# Patient Record
(demographics unavailable — no encounter records)

---

## 2017-02-28 NOTE — PDOC
Rapid Medical Evaluation


Time Seen by Provider: 02/28/17 15:29


Medical Evaluation: 


 Allergies











Allergy/AdvReac Type Severity Reaction Status Date / Time


 


Penicillins Allergy Severe Rash Verified 02/28/17 15:32


 


seafood Allergy Severe Rash Uncoded 02/28/17 15:32














02/28/17 15:34


I have performed a brief in-person evaluation of this patient. 


The patient presents with a chief complaint of: 58y/o M cardiac hx, COPD p/w 1 

week progressive cough, SOB. Sent by Dr. Cain to r/o PNA.





Pertinent physical exam findings:


tachy, O2 86-94%


bibasilar coarse breath sounds, slight wheezing


abd soft


tachy





I have ordered the following:


sepsis protocol initiated 2/2 tachy and relative hypoxia, r/o pna


ekg, cxr 





The patient will proceed to the ED for further evaluation.

## 2017-02-28 NOTE — PDOC
History of Present Illness





<Priscila Cordova - Last Filed: 02/28/17 18:23>





- History of Present Illness


Initial Comments: 


02/28/17 17:29


The patient is a 59 year old male with a past medical hx of COPD, asthma, CAD s/

p PCI and stents s/p CABG in 2014, HTN, HLD who presents to the ED complaining 

of cough and shortness of breath for 4 days. He reports his cough is 

productive. The patient reports he saw his PCP on Friday and was given a Zpack 

for his symptoms. The patient notes no relief of his symptoms despite taking 

the Zpack. The patient states he has oxygen at home but is not fully dependent 

on it. He states he used his nebulizer treatment one time today. He reports 

associated chills and diarrhea. 


The patient denies fever, chest pain


The patient denies nausea, vomiting, abdominal pain





PCP: Dr. Whitaker


Cardiologist: Dr. Ulrich


Social: Former smoker


Allergies: Penicillins





<Stormy Loya - Last Filed: 02/28/17 18:42>





- General


Chief Complaint: Shortness of Breath


Stated Complaint: SOB, WHEEZING


Time Seen by Provider: 02/28/17 15:29





Past History





- Past Medical History


Anemia: No


Asthma: No


Cancer: No


Cardiac Disorders: Yes (MI, stent, BYPASS)


CVA: No


COPD: Yes


CHF: Yes


Dementia: No


Diabetes: No


GI Disorders: No


 Disorders: No


HTN: Yes


Hypercholesterolemia: Yes


Liver Disease: No


Psychiatric Problems: Yes (anxiety)


Seizures: No


Thyroid Disease: No





- Surgical History


Abdominal Surgery: No


Appendectomy: No


Cardiac Surgery: Yes (Stent, cardiaccath)


Cholecystectomy: No


Lung Surgery: No


Neurologic Surgery: No


Orthopedic Surgery: Yes (KNEE)





- Immunization History


Immunization Up to Date: Yes





- Psycho/Social/Smoking Cessation Hx


Anxiety: No


Suicidal Ideation: No


Smoking Status: No


Smoking History: Former smoker


Have you smoked in the past 12 months: No


Number of Cigarettes Smoked Daily: 0


If you are a former smoker, when did you quit?: 2 years


Information on smoking cessation initiated: No


Hx Alcohol Use: No


Drug/Substance Use Hx: No


Substance Use Type: None


Hx Substance Use Treatment: No





<Priscila Cordova - Last Filed: 02/28/17 18:23>





<Stormy Loya - Last Filed: 02/28/17 18:42>





- Past Medical History


Allergies/Adverse Reactions: 


 Allergies











Allergy/AdvReac Type Severity Reaction Status Date / Time


 


Penicillins Allergy Severe Rash Verified 02/28/17 15:32


 


seafood Allergy Severe Rash Uncoded 02/28/17 15:32











Home Medications: 


Ambulatory Orders





Acetaminophen [Tylenol .Regular Strength -] 650 mg PO Q6H PRN #0 tablet 05/09/ 16 


Albuterol 2.5/Ipratropium 0.5 [Duoneb -] 1 amp NEB QIDR  amp 11/30/16 


Budesonide/Formeterol Fumarate [SYMBICORT 80/4.5mcg -] 1 puff IH BID  inhaler 11 /30/16 


Clopidogrel Bisulfate [Plavix -] 75 mg PO DAILY  tablet 11/30/16 


Furosemide [Lasix -] 40 mg PO DAILY  tablet 11/30/16 


Metoprolol Tartrate [Lopressor -] 25 mg PO DAILY 02/28/17 


Prednisone [Deltasone -] 10 mg PO DAILY 02/28/17 











Respiratory Specific PMHX





- Complaint Specific PMHX


Angina: No





<Priscila Cordova - Last Filed: 02/28/17 18:23>





**Review of Systems





- Review of Systems


Able to Perform ROS?: Yes


Comments:: 


02/28/17 17:29


CONSTITUTIONAL: 


+Chills Absent: fever, diaphoresis, generalized weakness, malaise, loss of 

appetite


HEENT: 


Absent: rhinorrhea, nasal congestion, throat pain, throat swelling, difficulty 

swallowing, mouth swelling, ear pain, eye pain, visual Changes


CARDIOVASCULAR: 


Absent: chest pain, syncope, palpitations, irregular heart rate, lightheadedness

, peripheral edema


RESPIRATORY: 


+Cough, shortness of breath. Absent: orthopnea, wheezing, stridor, hemoptysis


GASTROINTESTINAL:


+Diarrhea Absent: abdominal pain, abdominal distension, nausea, vomiting, 

constipation, melena, hematochezia


GENITOURINARY: 


Absent: dysuria, frequency, urgency, hesitancy, hematuria, flank pain, genital 

pain


MUSCULOSKELETAL: 


Absent: myalgia, arthralgia, joint swelling


SKIN: 


Absent: rash, itching, pallor


NEUROLOGIC: 


Absent: headache, focal weakness or paresthesias, dizziness, unsteady gait, 

seizure, mental status changes, bladder or bowel incontinence


PSYCHIATRIC: 


Absent: anxiety, depression, suicidal or homicidal ideation, hallucinations.





<Stormy Loya - Last Filed: 02/28/17 18:42>





*Physical Exam





- Vital Signs


 Last Vital Signs











Temp Pulse Resp BP Pulse Ox


 


 98.1 F   116 H  24   130/98   94 L


 


 02/28/17 15:32  02/28/17 15:32  02/28/17 15:32  02/28/17 15:32  02/28/17 15:32














<Priscila Cordova - Last Filed: 02/28/17 18:23>





- Vital Signs


 Last Vital Signs











Temp Pulse Resp BP Pulse Ox


 


 98.1 F   116 H  24   130/98   94 L


 


 02/28/17 15:32  02/28/17 15:32  02/28/17 15:32  02/28/17 15:32  02/28/17 15:32














- Physical Exam


Comments: 


GENERAL:


Well developed, well nourished. Awake and alert. 


HEENT:


Normocephalic, atraumatic. PERRLA, EOMI. No conjunctival pallor. Sclera are non-

icteric. Moist mucous membranes. Oropharynx is clear.


NECK: Supple. Full ROM. No JVD. Carotid pulses 2+ and symmetric, without 

bruits. No thyromegaly. No lymphadenopathy.


CARDIOVASCULAR:


+Tachycardic. Regular rhythm. No murmurs, rubs, or gallops. Distal pulses are 2

+ and symmetric. 


PULMONARY: 


+Hypoxic, dyspneic, scattered rhonchorous breath sounds. No rales.


ABDOMINAL:


Soft. Non-tender. Non-distended. No rebound or guarding. No organomegaly. 

Normoactive bowel sounds. 


MUSCULOSKELETAL 


Normal range of motion at all joints. No bony deformities or tenderness. No CVA 

tenderness.


EXTREMITIES: 


+1+ pitting edema bilateral lower extremities. No cyanosis. No clubbing. No 

calf tenderness.


SKIN: Warm and dry. Normal capillary refill. No rashes. No jaundice. 


NEUROLOGICAL: Alert, awake, appropriate. Cranial nerves 2-12 intact. No 

deficits to light touch and temperature in face, upper extremities and lower 

extremities. Normal speech. 


PSYCHIATRIC: 


Cooperative. Good eye contact. Appropriate mood and affect.








<BoStormy wade - Last Filed: 02/28/17 18:42>





ED Treatment Course





- LABORATORY


CBC & Chemistry Diagram: 


 02/28/17 16:06





 02/28/17 16:06





- ADDITIONAL ORDERS


Additional order review: 


 Laboratory  Results











  02/28/17





  16:06


 


INR  Cancelled


 


PTT (Actin FS)  Cancelled








 











  02/28/17





  16:06


 


RBC  4.66


 


MCV  85.1


 


MCHC  33.3


 


RDW  15.2  D


 


MPV  7.6


 


Neutrophils %  83.7 H


 


Lymphocytes %  10.2  D


 


Monocytes %  5.8


 


Eosinophils %  0.0


 


Basophils %  0.3














- Medications


Given in the ED: 


ED Medications














Discontinued Medications














Generic Name Dose Route Start Last Admin





  Trade Name Freq  PRN Reason Stop Dose Admin


 


Albuterol/Ipratropium  1 amp 02/28/17 16:37 02/28/17 16:44





  Duoneb -  NEB 02/28/17 16:38  1 amp





  ONCE ONE   Administration














<Priscila Cordova - Last Filed: 02/28/17 18:23>





- LABORATORY


CBC & Chemistry Diagram: 


 02/28/17 16:06





 02/28/17 16:06





- ADDITIONAL ORDERS


Additional order review: 


 Laboratory  Results











  02/28/17 02/28/17 02/28/17





  17:15 16:06 16:06


 


INR   


 


PTT (Actin FS)   


 


VBG pH  7.33  


 


POC VBG pCO2  65.7 H* D  


 


POC VBG pO2  24.9 L  


 


Mixed VBG HCO3  34.3 H  


 


Sodium    138


 


Potassium    4.6


 


Chloride    95 L


 


Carbon Dioxide    32


 


Anion Gap    11


 


BUN    12  D


 


Creatinine    0.9


 


Creat Clearance w eGFR    > 60


 


Random Glucose    115 H D


 


Lactic Acid   1.392 


 


Calcium    9.6


 


Total Bilirubin    0.9  D


 


AST    20  D


 


ALT    27  D


 


Alkaline Phosphatase    206 H D


 


Creatine Kinase    80


 


Troponin I    < 0.02  D


 


B-Natriuretic Peptide    46.19


 


Total Protein    8.2  D


 


Albumin    3.8  D


 


Urine Color   


 


Urine Appearance   


 


Urine pH   


 


Ur Specific Gravity   


 


Urine Protein   


 


Urine Glucose (UA)   


 


Urine Ketones   


 


Urine Blood   


 


Urine Nitrite   


 


Urine Bilirubin   


 


Urine Urobilinogen   


 


Ur Leukocyte Esterase   














  02/28/17 02/28/17





  16:06 16:06


 


INR   Cancelled


 


PTT (Actin FS)   Cancelled


 


VBG pH  


 


POC VBG pCO2  


 


POC VBG pO2  


 


Mixed VBG HCO3  


 


Sodium  


 


Potassium  


 


Chloride  


 


Carbon Dioxide  


 


Anion Gap  


 


BUN  


 


Creatinine  


 


Creat Clearance w eGFR  


 


Random Glucose  


 


Lactic Acid  


 


Calcium  


 


Total Bilirubin  


 


AST  


 


ALT  


 


Alkaline Phosphatase  


 


Creatine Kinase  


 


Troponin I  


 


B-Natriuretic Peptide  


 


Total Protein  


 


Albumin  


 


Urine Color  Yellow 


 


Urine Appearance  Clear 


 


Urine pH  5.0 


 


Ur Specific Gravity  1.023 


 


Urine Protein  Negative 


 


Urine Glucose (UA)  Negative 


 


Urine Ketones  Trace H 


 


Urine Blood  Negative 


 


Urine Nitrite  Negative 


 


Urine Bilirubin  Negative 


 


Urine Urobilinogen  Negative 


 


Ur Leukocyte Esterase  Negative 




















 02/28/17 16:06 Influenza Types A,B Antigen (RAI) - Final





 Nasopharyngeal Swab  - Final








 











  02/28/17





  16:06


 


RBC  4.66


 


MCV  85.1


 


MCHC  33.3


 


RDW  15.2  D


 


MPV  7.6


 


Neutrophils %  83.7 H


 


Lymphocytes %  10.2  D


 


Monocytes %  5.8


 


Eosinophils %  0.0


 


Basophils %  0.3














- RADIOLOGY


Radiograph Interpretation: 


02/28/17 17:40


Chest X-Ray


No discrete infiltrate or pleural effusion is noted. 


There has been no definite interval change in comparison to a previous 

radiographic study of 11/26/ 2016. Advanced centrilobular emphysema is noted (as also visualized on a chest 

CT exam of 8/17/2016). 


The heart, alex and mediastinum demonstrate no obvious abnormality. 


Status post median sternotomy. 


Impression: 


No definite interval change is seen as discussed. 


Reported By: Troy Avalos MD 


 02/28/17 1720





- Medications


Given in the ED: 


ED Medications














Discontinued Medications














Generic Name Dose Route Start Last Admin





  Trade Name Freq  PRN Reason Stop Dose Admin


 


Albuterol/Ipratropium  1 amp 02/28/17 16:37 02/28/17 16:44





  Duoneb -  NEB 02/28/17 16:38  1 amp





  ONCE ONE   Administration














<Stormy Loya - Last Filed: 02/28/17 18:42>





Medical Decision Making





- Medical Decision Making


02/28/17 16:57


60 yo male with PMH copd,CAD, s/p cabg  presents for increasing dyspnea and 

cough for past 3-4 adys


-he was referred in by his pulmonololgist Dr Cain


-primary physician Dr Zambrano





Vs  tachycardia 115, hypoxic 89% on room air





-course rhonchi b/l on lung asculatation


-cvs tachy


-scant pitting edema 








differential includes pna,copd exacerbation,chf


-curently receiving resp treatment and placed on 2 L nasal cannula





02/28/17 18:20


negative cardiac enzyme


-bnp is normal


cxr negative 





IMP  copd exavcerba





<Priscila Cordova - Last Filed: 02/28/17 18:23>





- Medical Decision Making





02/28/17 18:42


Paged Dr. Whitaker at 1824, patients case was discussed





<Stormy Loya - Last Filed: 02/28/17 18:42>





*DC/Admit/Observation/Transfer





- Discharge Dispostion


Admit: Yes





<Priscila Cordova - Last Filed: 02/28/17 18:23>





- Attestations


Scribe Attestion: 





02/28/17 17:29





Documentation prepared by Stormy Loya, acting as medical scribe for Priscila Cordova MD/DO.





<Stormy Loya - Last Filed: 02/28/17 18:42>


Diagnosis at time of Disposition: 


 COPD exacerbation, Acute on chronic respiratory failure with hypoxemia, Cough





- Referrals


Referrals: 


Keanu Whitaker MD [Primary Care Provider] -

## 2017-03-01 NOTE — PN
Teaching Attending Note


Name of Resident: Cris Gomez


ATTENDING PHYSICIAN STATEMENT





I saw and evaluated the patient.


I reviewed the resident's note and discussed the case with the resident.


I agree with the resident's findings and plan as documented.





PULMONARY CONSULTATION








IMP ACUTE ON CHRONIC HYPOXEMIC/HYPERCAPNEC RESPIRATORY FAILURE


      COPD EXACERBATION


      ASHD /P CABG,STENTS


      HTN


      HLD





PLAN IV STEROIDS 


        INHALED BRONCHODILATORS


        O2


        BIPAP IF PT DEVELOPES INCREASED RESPIRATORY DISTRESS,HYPERCAPNEA 


        ANTIBIOTICS


        ABG


        DALIRESP





DR OROPEZA








Problem List





- Problems


(1) Acute on chronic respiratory failure with hypoxia and hypercapnia


Code(s): J96.21 - ACUTE AND CHRONIC RESPIRATORY FAILURE WITH HYPOXIA


J96.22 - ACUTE AND CHRONIC RESPIRATORY FAILURE WITH HYPERCAPNIA





(2) COPD exacerbation


Code(s): J44.1 - CHRONIC OBSTRUCTIVE PULMONARY DISEASE W (ACUTE) EXACERBATION





(3) Cough


Code(s): R05 - COUGH





(4) Anxiety


Code(s): F41.9 - ANXIETY DISORDER, UNSPECIFIED





(5) CAD (coronary artery disease)


Code(s): I25.10 - ATHSCL HEART DISEASE OF NATIVE CORONARY ARTERY W/O ANG PCTRS 

  Qualifiers: 


     Coronary Disease-Associated Artery/Lesion type: native artery     Native 

vs. transplanted heart: native heart     Associated angina: without angina     

   Qualified Code(s): I25.10 - Atherosclerotic heart disease of native coronary 

artery without angina pectoris  





(6) COPD (chronic obstructive pulmonary disease)


Code(s): J44.9 - CHRONIC OBSTRUCTIVE PULMONARY DISEASE, UNSPECIFIED   





(7) HTN (hypertension)


Code(s): I10 - ESSENTIAL (PRIMARY) HYPERTENSION   Qualifiers: 


     Hypertension type: essential hypertension        Qualified Code(s): I10 - 

Essential (primary) hypertension  





(8) History of coronary artery bypass graft


Code(s): Z95.1 - PRESENCE OF AORTOCORONARY BYPASS GRAFT

## 2017-03-01 NOTE — EKG
Test Reason : 

Blood Pressure : ***/*** mmHG

Vent. Rate : 110 BPM     Atrial Rate : 110 BPM

   P-R Int : 112 ms          QRS Dur : 130 ms

    QT Int : 352 ms       P-R-T Axes : 079 085 061 degrees

   QTc Int : 476 ms

 

*** POOR DATA QUALITY, INTERPRETATION MAY BE ADVERSELY AFFECTED

SINUS TACHYCARDIA WITH OCCASIONAL PREMATURE VENTRICULAR COMPLEXES

POSSIBLE LEFT ATRIAL ENLARGEMENT

RIGHT BUNDLE BRANCH BLOCK

ABNORMAL ECG

WHEN COMPARED WITH ECG OF 24-NOV-2016 16:33,

PREMATURE VENTRICULAR COMPLEXES ARE NOW PRESENT

Confirmed by GERSON TIERNEY, TATA ([id number]) on 3/1/2017 2:13:27 PM

 

Referred By:             Confirmed By:TATA NIETO MD

## 2017-03-01 NOTE — HP
DATE OF ADMISSION:

 

HISTORY:  This is a 59-year-old male known to have COPD, ASHD status post coronary

artery bypass graft surgery who was sent by Dr. Cain to the emergency room because

of increasing shortness of breath.  In the ER, chest x-ray was done, which was no

infiltrate.  Got admitted with the diagnosis of acute exacerbation of COPD.  This

morning he is feeling a little better.

 

PHYSICAL EXAMINATION: 

General:  Awake, alert, and oriented not in distress.

Vital Signs:  /80, pulse 72, respirations 20, temperature 97.

HEENT:  Unremarkable.

NECK:  Supple.  No JVD.

LUNGS:  Air entry poor both sides.

HEART:  S1, S2 normal.  No S3, S4.

ABDOMEN:  Soft, nontender.

EXTREMITIES:  Legs, no edema.

NEUROLOGIC:  Grossly normal.

 

LABORATORY:  At the time of admission, WBC 11.2 and this morning 8.4.  Hemoglobin

12.3.  Chemistry:  Electrolytes are normal.  Albumin 3.2.  Chest x-ray:  Chronic

changes, no acute infiltrate.

 

IMPRESSION:  Exacerbation of chronic obstructive pulmonary disease, arteriosclerotic

heart disease.

 

PLAN:  IV steroids.  IV antibiotics.  Pulmonary consult, Dr. Cain.  Will follow.

 

 

 

ARNOL SAPP M.D.

 

[id number]

DD: 03/01/2017 09:43

DT: 03/01/2017 18:30

Job #:  [id number]

## 2017-03-01 NOTE — HOSP
Subjective





- Review of Symptoms


Events since last encounter: 


RN call to inform that patient was getting IV azithromycin and it infiltrated 

in surrounding tissue 





Patient seen and examined. 


Patient complains of pain in site of infiltration, denies and numbness and 

weakness. Denies rash, 


Moving all fingers of left had.  





on exam 


/78


hr 119


SPO2 95% ON 2 L





chest ; scattered rhonci breath sounds. No rales.


CVS s1s2 normal


local exam: Left upper limb  moving all fingers, sensation to touch intact, 

power 5/5, peripheral pulses palpable. mild swelling preset in site of 

infiltration, tender    to touch, no erythema. 








Adv ; 





Remove IV line from site of infiltration.


limb elevation 


cold compression   











 





Physical Examination


Vital Signs: 


 Vital Signs











Temperature  98.1 F   02/28/17 15:32


 


Pulse Rate  118 H  02/28/17 19:02


 


Respiratory Rate  20   02/28/17 19:02


 


Blood Pressure  139/108   02/28/17 19:02


 


O2 Sat by Pulse Oximetry (%)  100   02/28/17 19:02














Visit type





- Emergency Visit


Emergency Visit: Yes


ED Registration Date: 02/28/17


Care time: The patient presented to the Emergency Department on the above date 

and was hospitalized for further evaluation of their emergent condition.





- New Patient


This patient is new to me today: Yes


Date on this admission: 03/01/17





- Critical Care


Critical Care patient: No

## 2017-03-01 NOTE — CONSULT
Consultation: 


REQUESTING PROVIDER:





CONSULT REQUEST: We have been asked to medically evaluate this patient for sob





HISTORY OF PRESENT ILLNESS:


This is a 60 yo m with PMH of COPD on 2L at home (noncompliant), past heavy 

smoker, asthma, CAD s/p PCI, CABG 2014, HTN, HLD, who presents due to SOB that 

started 4 days ago and has gotten worse. He reports increased cough productive 

of yellow flem, chills and diarrhea. His PCP prescribed him a Z Pack past fri, 

which helped loosen his respiratory secretions but didnt improve his sob. He 

currently feels unimproved from presentation. He is able to ambulate to 

bathroom on 2 L O2. He denies f/c, n/v, chest pain, abd pain, h/a, rhinorrhea 

or throat pain. Denies sick contacts. He smoked 1.5 packs/day (quit) and worked 

as a . 








REVIEW OF SYSTEMS:


CONSTITUTIONAL: 


Absent:  diaphoresis, malaise, loss of appetite, weight change


HEENT: 


Absent:  rhinorrhea, nasal congestion, throat pain, throat swelling


CARDIOVASCULAR: 


Absent: chest pain, syncope, palpitations, peripheral edema


RESPIRATORY: 


Absent: orthopnea, stridor, hemoptysis


GASTROINTESTINAL:


Absent: abdominal pain, abdominal distension, nausea, vomiting


GENITOURINARY: 


Absent: dysuria


MUSCULOSKELETAL: 


Absent: myalgia, arthralgia


SKIN: 


Absent: rash, itching, pallor


HEMATOLOGIC/IMMUNOLOGIC: 


Absent: frequent infections


ENDOCRINE:


Absent: heat intolerance, cold intolerance


NEUROLOGIC: 


Absent: headache, focal weakness or paresthesias, dizziness


PSYCHIATRIC: 


Absent: anxiety, depression





PHYSICAL EXAMINATION





 Vital Signs - 24 hr











  02/28/17 02/28/17 03/01/17





  19:02 21:00 01:48


 


Temperature  98.9 F 


 


Pulse Rate 118 H 119 H 89


 


Respiratory 20 24 





Rate   


 


Blood Pressure 139/108 151/78 


 


O2 Sat by Pulse 100 95 





Oximetry (%)   














  03/01/17 03/01/17 03/01/17





  02:00 06:00 10:00


 


Temperature  97.8 F 97.8 F


 


Pulse Rate  92 H 108 H


 


Respiratory  20 24





Rate   


 


Blood Pressure  127/72 127/78


 


O2 Sat by Pulse 100  





Oximetry (%)   














GENERAL: Awake, alert, and fully oriented, in no acute distress.


HEAD: Normal with no signs of trauma.


EYES: Pupils equal, round and reactive to light, extraocular movements intact, 

sclera anicteric, conjunctiva clear. 


EARS, NOSE, THROAT: Moist mucous membranes.


NECK: supple without JVD


LUNGS: diffusely restricted air movement, no wheezes 


HEART: Regular rate and rhythm, normal S1 and S2 


ABDOMEN: Soft, nontender, not distended, normoactive bowel sounds


MUSCULOSKELETAL: No CVA tenderness.


UPPER EXTREMITIES: No peripheral edema.


LOWER EXTREMITIES: No peripheral edema. 


NEUROLOGICAL:  Cranial nerves II-XII grossly intact. Normal speech. 


PSYCHIATRIC: Cooperative. Good eye contact. Appropriate mood and affect.


SKIN: Warm, dry











 Laboratory Results - last 24 hr











  03/01/17 03/01/17





  07:15 07:15


 


WBC  8.4 


 


RBC  4.35 


 


Hgb  12.3 


 


Hct  37.2 


 


MCV  85.5 


 


MCHC  33.1 


 


RDW  14.7 


 


Plt Count  347 


 


MPV  7.2 L 


 


Sodium   137


 


Potassium   4.6


 


Chloride   99


 


Carbon Dioxide   31


 


Anion Gap   7 L


 


BUN   15  D


 


Creatinine   0.8


 


Creat Clearance w eGFR   > 60


 


Random Glucose   124 H


 


Calcium   9.3


 


Total Bilirubin   0.6  D


 


AST   15  D


 


ALT   20  D


 


Alkaline Phosphatase   175 H


 


Total Protein   7.1


 


Albumin   3.2 L








Active Medications











Generic Name Dose Route Start Last Admin





  Trade Name Freq  PRN Reason Stop Dose Admin


 


Acetaminophen  650 mg 02/28/17 21:11 03/01/17 10:40





  Tylenol -  PO   650 mg





  Q6H PRN   Administration





  FEVER OR PAIN   


 


Albuterol/Ipratropium  1 amp 03/01/17 00:00 03/01/17 06:11





  Duoneb -  NEB   1 amp





  QIDR ISAIAH   Administration


 


Alprazolam  0.5 mg 02/28/17 21:13 02/28/17 22:40





  Xanax -  PO   0.5 mg





  HS PRN   Administration


 


Budesonide/Formoterol Fumarate  1 puff 02/28/17 22:00 03/01/17 10:42





  Symbicort 80/4.5mcg -  IH   1 inh





  BID ISAIAH   Administration


 


Ceftriaxone Sodium  1 gm 02/28/17 22:00 03/01/17 11:37





  Rocephin 1gm Ivpb (Pre-Docked)  IVPB   1 gm





  BID ISAIAH   Administration


 


Clopidogrel Bisulfate  75 mg 03/01/17 10:00 03/01/17 10:40





  Plavix -  PO   75 mg





  DAILY ISAIAH   Administration


 


Furosemide  40 mg 03/01/17 10:00 03/01/17 10:42





  Lasix -  PO   40 mg





  DAILY ISAIAH   Administration


 


Azithromycin 250 mg/ Dextrose  250 mls @ 250 mls/hr 02/28/17 21:15 03/01/17 12:

17





  IVPB 03/02/17 10:59  250 mls/hr





  DAILY ISAIAH   Administration


 


Methylprednisolone Sodium Succinate  40 mg 03/01/17 02:00 03/01/17 10:40





  Solu-Medrol -  IVPB   40 mg





  Q8H-IV ISAIAH   Administration











ASSESSMENT/PLAN:








Acute on chonic COPD exacerbation with hypoxemic, hypercarbic respiratory 

failure 


-possibly superimposed PNA 


-CXR unremarkable


-noncompliant with home O2 (supposed to wear continuously) 


-clinically unimproved at this time


-Medrol 40 Q8


-azithromycin, rocephin day 2


-daliresp, spiriva


-symbicort, dioneb


-supplemental O2 NC, possible BIPAP if desats 


-will require pulmonary rehab     





CAD


-s/p PCI, CABG


-No apparent acute events/exacerbation 


-regular outpatient cardiac f/u     





HTN


-lasix       





HLD


-continue home meds 





Dispo: We will continue to follow the patient. Thank you for this consultative 

opportunity.











Problem List





- Problems


(1) Acute on chronic respiratory failure with hypoxemia


Code(s): J96.21 - ACUTE AND CHRONIC RESPIRATORY FAILURE WITH HYPOXIA





(2) Acute on chronic respiratory failure with hypoxia and hypercapnia


Code(s): J96.21 - ACUTE AND CHRONIC RESPIRATORY FAILURE WITH HYPOXIA


J96.22 - ACUTE AND CHRONIC RESPIRATORY FAILURE WITH HYPERCAPNIA





(3) COPD exacerbation


Code(s): J44.1 - CHRONIC OBSTRUCTIVE PULMONARY DISEASE W (ACUTE) EXACERBATION





(4) Cough


Code(s): R05 - COUGH





(5) Abnormal LFTs


Code(s): R79.89 - OTHER SPECIFIED ABNORMAL FINDINGS OF BLOOD CHEMISTRY





(6) Anxiety


Code(s): F41.9 - ANXIETY DISORDER, UNSPECIFIED





(7) CAD (coronary artery disease)


Code(s): I25.10 - ATHSCL HEART DISEASE OF NATIVE CORONARY ARTERY W/O ANG PCTRS 

  Qualifiers: 


     Coronary Disease-Associated Artery/Lesion type: native artery     Native 

vs. transplanted heart: native heart     Associated angina: without angina     

   Qualified Code(s): I25.10 - Atherosclerotic heart disease of native coronary 

artery without angina pectoris  





(8) COPD (chronic obstructive pulmonary disease)


Code(s): J44.9 - CHRONIC OBSTRUCTIVE PULMONARY DISEASE, UNSPECIFIED   





(9) HTN (hypertension)


Code(s): I10 - ESSENTIAL (PRIMARY) HYPERTENSION   Qualifiers: 


     Hypertension type: essential hypertension        Qualified Code(s): I10 - 

Essential (primary) hypertension  





(10) History of coronary artery bypass graft


Code(s): Z95.1 - PRESENCE OF AORTOCORONARY BYPASS GRAFT





(11) History of percutaneous coronary intervention


Code(s): Z98.89 - OTHER SPECIFIED POSTPROCEDURAL STATES * DO NOT USE *





(12) Hypercholesterolemia


Code(s): E78.0 - PURE HYPERCHOLESTEROLEMIA * DO NOT USE *





(13) Pneumonia


Code(s): J18.9 - PNEUMONIA, UNSPECIFIED ORGANISM   








Visit type





- Emergency Visit


Emergency Visit: Yes


ED Registration Date: 02/28/17


Care time: The patient presented to the Emergency Department on the above date 

and was hospitalized for further evaluation of their emergent condition.





- New Patient


This patient is new to me today: Yes


Date on this admission: 03/01/17





- Critical Care


Critical Care patient: No

## 2017-03-02 NOTE — PN
Teaching Attending Note


Name of Resident: Cris Gomez


ATTENDING PHYSICIAN STATEMENT








I saw and evaluated the patient.


I reviewed the resident's note and discussed the case with the resident.


I agree with the resident's findings and plan as documented.














IMP ACUTE ON CHRONIC HYPOXEMIC/HYPERCAPNEC RESPIRATORY FAILURE


      COPD EXACERBATION


      ASHD /P CABG,STENTS


      HTN


      HLD





PLAN IV STEROIDS 


        INHALED BRONCHODILATORS


        O2


        BIPAP IF PT DEVELOPES INCREASED RESPIRATORY DISTRESS,HYPERCAPNEA 


        ANTIBIOTICS


        ABG


        ELLA SOLIZ MD

## 2017-03-02 NOTE — PN
Physical Exam: 


SUBJECTIVE: Patient seen and examined





Patient resting in bed comfortably NAD. No acute events. afebrile and 

hemodynamically stable. 98% on 2 L. states he vomited at night duwe to severe 

cough, which has now improved. States he feels a little less sob as well. 

yellow sputum. walked to  w/o O2 and mild lightneadedness. Denies f/c

, chest pain, abd pain, n/v, h/a, diarrhea, dysuria. 





OBJECTIVE:





 Vital Signs











 Period  Temp  Pulse  Resp  BP Sys/Baer  Pulse Ox


 


 Last 24 Hr  97.2 F-98.5 F  109-112  16-24  116-142/68-79  98











GENERAL: Awake, alert, and fully oriented, in no acute distress.


HEAD: Normal with no signs of trauma.


EYES: Pupils equal, round and reactive to light, extraocular movements intact, 

sclera anicteric, conjunctiva clear. 


EARS, NOSE, THROAT: Moist mucous membranes.


NECK: supple without JVD


LUNGS: slightly improved air movement


HEART: Regular rate and rhythm, normal S1 and S2 


ABDOMEN: Soft, nontender, not distended, normoactive bowel sounds


MUSCULOSKELETAL: No CVA tenderness.


UPPER EXTREMITIES: No peripheral edema.


LOWER EXTREMITIES: No peripheral edema. 


NEUROLOGICAL:  Cranial nerves II-XII grossly intact. Normal speech. 


PSYCHIATRIC: Cooperative. Good eye contact. Appropriate mood and affect.


SKIN: Warm, dry





Active Medications











Generic Name Dose Route Start Last Admin





  Trade Name Freq  PRN Reason Stop Dose Admin


 


Acetaminophen  650 mg 02/28/17 21:11 03/01/17 10:40





  Tylenol -  PO   650 mg





  Q6H PRN   Administration





  FEVER OR PAIN   


 


Albuterol Sulfate  1 amp 03/01/17 14:03 03/02/17 03:38





  Ventolin 0.083% Nebulizer Soln -  NEB   1 amp





  Q4H PRN   Administration





  SHORT OF BREATH/WHEEZING   


 


Alprazolam  0.5 mg 02/28/17 21:13 03/01/17 21:25





  Xanax -  PO   0.5 mg





  HS PRN   Administration


 


Budesonide/Formoterol Fumarate  1 puff 02/28/17 22:00 03/02/17 10:23





  Symbicort 80/4.5mcg -  IH   1 inh





  BID ISAIAH   Administration


 


Ceftriaxone Sodium  1 gm 02/28/17 22:00 03/02/17 10:19





  Rocephin 1gm Ivpb (Pre-Docked)  IVPB   1 gm





  BID ISAIAH   Administration


 


Clopidogrel Bisulfate  75 mg 03/01/17 10:00 03/02/17 10:19





  Plavix -  PO   75 mg





  DAILY ISAIAH   Administration


 


Furosemide  40 mg 03/01/17 10:00 03/02/17 10:18





  Lasix -  PO   40 mg





  DAILY ISAIAH   Administration


 


Methylprednisolone Sodium Succinate  60 mg 03/01/17 15:00 03/02/17 10:18





  Solu-Medrol -  IVPB   60 mg





  Q6H-IV ISAIAH   Administration


 


Roflumilast  500 mcg 03/01/17 14:15 03/02/17 10:18





  Daliresp -  PO   500 mcg





  DAILY ISAIAH   Administration


 


Tiotropium Bromide  1 puff 03/01/17 14:15 03/02/17 10:19





  Spiriva -  IH   1 pfu





  DAILY ISAIAH   Administration











ASSESSMENT/PLAN:


Acute on chonic COPD exacerbation with hypoxemic, hypercarbic respiratory 

failure 


-possibly superimposed PNA 


-CXR unremarkable


-noncompliant with home O2 (supposed to wear continuously) 


-slight clinical improvement 


-Medrol 40 Q8 d 2 


-azithromycin, rocephin day 3


-daliresp, spiriva


-symbicort, dioneb


-supplemental O2 NC, possible BIPAP if desats 


-will require pulmonary rehab     





CAD


-s/p PCI, CABG


-No apparent acute events/exacerbation 


-regular outpatient cardiac f/u     





HTN


-lasix       





HLD


-continue home meds 





Dispo: We will continue to follow the patient. Thank you for this consultative 

opportunity.





Problem List





- Problems


(1) Acute on chronic respiratory failure with hypoxemia


Code(s): J96.21 - ACUTE AND CHRONIC RESPIRATORY FAILURE WITH HYPOXIA





(2) Acute on chronic respiratory failure with hypoxia and hypercapnia


Code(s): J96.21 - ACUTE AND CHRONIC RESPIRATORY FAILURE WITH HYPOXIA


J96.22 - ACUTE AND CHRONIC RESPIRATORY FAILURE WITH HYPERCAPNIA





(3) COPD exacerbation


Code(s): J44.1 - CHRONIC OBSTRUCTIVE PULMONARY DISEASE W (ACUTE) EXACERBATION





(4) Cough


Code(s): R05 - COUGH





(5) Abnormal LFTs


Code(s): R79.89 - OTHER SPECIFIED ABNORMAL FINDINGS OF BLOOD CHEMISTRY





(6) Anxiety


Code(s): F41.9 - ANXIETY DISORDER, UNSPECIFIED





(7) CAD (coronary artery disease)


Code(s): I25.10 - ATHSCL HEART DISEASE OF NATIVE CORONARY ARTERY W/O ANG PCTRS 

  Qualifiers: 


     Coronary Disease-Associated Artery/Lesion type: native artery     Native 

vs. transplanted heart: native heart     Associated angina: without angina     

   Qualified Code(s): I25.10 - Atherosclerotic heart disease of native coronary 

artery without angina pectoris  





(8) COPD (chronic obstructive pulmonary disease)


Code(s): J44.9 - CHRONIC OBSTRUCTIVE PULMONARY DISEASE, UNSPECIFIED   





(9) HTN (hypertension)


Code(s): I10 - ESSENTIAL (PRIMARY) HYPERTENSION   Qualifiers: 


     Hypertension type: essential hypertension        Qualified Code(s): I10 - 

Essential (primary) hypertension  





(10) History of coronary artery bypass graft


Code(s): Z95.1 - PRESENCE OF AORTOCORONARY BYPASS GRAFT





(11) History of percutaneous coronary intervention


Code(s): Z98.89 - OTHER SPECIFIED POSTPROCEDURAL STATES * DO NOT USE *





(12) Hypercholesterolemia


Code(s): E78.0 - PURE HYPERCHOLESTEROLEMIA * DO NOT USE *





(13) Pneumonia


Code(s): J18.9 - PNEUMONIA, UNSPECIFIED ORGANISM   








Visit type





- Emergency Visit


Emergency Visit: Yes


ED Registration Date: 02/28/17


Care time: The patient presented to the Emergency Department on the above date 

and was hospitalized for further evaluation of their emergent condition.





- New Patient


This patient is new to me today: No





- Critical Care


Critical Care patient: No





- Discharge Referral


Referred to [facility] No

## 2017-03-02 NOTE — PN
Progress Note, Physician


Chief Complaint: 


SOB persists


History of Present Illness: 


Dr Meng pulmonary consult appreciated





- Current Medication List


Current Medications: 


Active Medications





Acetaminophen (Tylenol -)  650 mg PO Q6H PRN


   PRN Reason: FEVER OR PAIN


   Last Admin: 03/01/17 10:40 Dose:  650 mg


Albuterol Sulfate (Ventolin 0.083% Nebulizer Soln -)  1 amp NEB Q4H PRN


   PRN Reason: SHORT OF BREATH/WHEEZING


   Last Admin: 03/02/17 03:38 Dose:  1 amp


Alprazolam (Xanax -)  0.5 mg PO HS PRN


   Last Admin: 03/01/17 21:25 Dose:  0.5 mg


Budesonide/Formoterol Fumarate (Symbicort 80/4.5mcg -)  1 puff IH BID ISAIAH


   Last Admin: 03/01/17 21:25 Dose:  1 inh


Ceftriaxone Sodium (Rocephin 1gm Ivpb (Pre-Docked))  1 gm IVPB BID ISAIAH


   Last Admin: 03/01/17 21:25 Dose:  1 gm


Clopidogrel Bisulfate (Plavix -)  75 mg PO DAILY [facility]


   Last Admin: 03/01/17 10:40 Dose:  75 mg


Furosemide (Lasix -)  40 mg PO DAILY [facility]


   Last Admin: 03/01/17 10:42 Dose:  40 mg


Azithromycin 250 mg/ Dextrose  250 mls @ 250 mls/hr IVPB DAILY [facility]


   Stop: 03/02/17 10:59


   Last Admin: 03/01/17 12:17 Dose:  250 mls/hr


Methylprednisolone Sodium Succinate (Solu-Medrol -)  60 mg IVPB Q6H-IV ISAIAH


   Last Admin: 03/02/17 03:25 Dose:  60 mg


Roflumilast (Daliresp -)  500 mcg PO DAILY ISAIAH


   Last Admin: 03/01/17 16:50 Dose:  500 mcg


Tiotropium Bromide (Spiriva -)  1 puff IH DAILY [facility]


   Last Admin: 03/01/17 16:50 Dose:  1 pfu











- Objective


Vital Signs: 


 Vital Signs











Temperature  97.9 F   03/02/17 06:00


 


Pulse Rate  109 H  03/02/17 06:00


 


Respiratory Rate  18   03/02/17 06:00


 


Blood Pressure  116/70   03/02/17 06:00


 


O2 Sat by Pulse Oximetry (%)  98   03/01/17 21:00











Constitutional: Yes: Mild Distress


Eyes: Yes: WNL


HENT: Yes: WNL


Neck: Yes: WNL


Cardiovascular: Yes: WNL


Respiratory: Yes: On Nasal O2, SOB


Gastrointestinal: Yes: WNL


...Rectal Exam: Yes: Deferred


Genitourinary: Yes: WNL


Breast(s): Yes: WNL


Musculoskeletal: Yes: WNL


Neurological: Yes: Alert


Labs: 


 CBC, BMP





 03/01/17 07:15 





 03/01/17 07:15 





 INR, PTT











INR  1.06  (0.82-1.09)   02/28/17  16:06    














Assessment/Plan


Continue same trt

## 2017-03-03 NOTE — CON.CARD
Consult


Consult Specialty:: Cardiology 


Reason for Consultation:: SOB





- History of Present Illness


History of Present Illness: 


This is a 58 yo m with PMH of COPD on 2L at home (noncompliant), past heavy 

smoker, asthma, CAD s/p PCI, CABG 2014, HTN, HLD, who presents due to SOB that 

started 4 days ago and has gotten worse. He reports increased cough productive 

of yellow flem, chills and diarrhea. His PCP prescribed him a Z Pack past fri, 

which helped loosen his respiratory secretions but didnt improve his sob. He 

currently feels unimproved from presentation. He is able to ambulate to 

bathroom on 2 L O2. He denies f/c, n/v, chest pain, abd pain, h/a, rhinorrhea 

or throat pain. Denies sick contacts. He smoked 1.5 packs/day (quit) and worked 

as a . 








   CABG SVG to RCA   Dec. 2014   Dr. Duarte      


 


Medical History      Reviewed


Condition   Date   Treating Physician   Comments


Abnormal stress test showing inferior wall ischemia   October 13, 2014   [facility]      


CAD- Stent o RCA   2008   [facility]      


COPD            


 oRCA prior stent site, o/w nonobstructive ds   Oct. 2014   Dr. France 

[facility]      


Rheumatoid Arthritis            


unsucesfull  attempt   Dec. 3 2014   [facility]      


 





- History Source


History Provided By: Patient, Medical Record





- Past Medical History


Cardio/Vascular: Yes: CAD, HTN, Hyperlipdemia


Pulmonary: Yes: COPD





- Past Surgical History


Past Surgical History: Yes: CABG, Stent





- Alcohol/Substance Use


Hx Alcohol Use: No





- Smoking History


Smoking history: Former smoker


Have you smoked in the past 12 months: No


Aproximately how many cigarettes per day: 0


If you are a former smoker, when did you quit?: 2 years





Home Medications





- Allergies


Allergies/Adverse Reactions: 


 Allergies











Allergy/AdvReac Type Severity Reaction Status Date / Time


 


Penicillins Allergy Severe Rash Verified 02/28/17 15:32


 


seafood Allergy Severe Rash Uncoded 02/28/17 15:32














- Home Medications


Home Medications: 


Ambulatory Orders





Acetaminophen [Tylenol .Regular Strength -] 650 mg PO Q6H PRN #0 tablet 05/09/ 16 


Albuterol 2.5/Ipratropium 0.5 [Duoneb -] 1 amp NEB QIDR  amp 11/30/16 


Budesonide/Formeterol Fumarate [SYMBICORT 80/4.5mcg -] 1 puff IH BID  inhaler 11 /30/16 


Clopidogrel Bisulfate [Plavix -] 75 mg PO DAILY  tablet 11/30/16 


Furosemide [Lasix -] 40 mg PO DAILY  tablet 11/30/16 


Metoprolol Tartrate [Lopressor -] 25 mg PO DAILY 02/28/17 


Prednisone [Deltasone -] 10 mg PO DAILY 02/28/17 











Family Disease History





- Family Disease History


Family Disease History: Other: Brother (Lupus)





Review of Systems





- Review of Systems


Constitutional: reports: No Symptoms


Eyes: reports: No Symptoms


HENT: reports: No Symptoms


Neck: reports: No Symptoms


Cardiovascular: reports: No Symptoms


Respiratory: reports: SOB


Gastrointestinal: reports: No Symptoms


Genitourinary: reports: No Symptoms


Breasts: reports: No Symptoms Reported


Musculoskeletal: reports: No Symptoms


Integumentary: reports: No Symptoms


Neurological: reports: No Symptoms


Endocrine: reports: No Symptoms


Hematology/Lymphatic: reports: No Symptoms


Psychiatric: reports: No Symptoms


Vital Signs: 


 Vital Signs











Temperature  97.9 F   03/03/17 14:57


 


Pulse Rate  121 H  03/03/17 14:57


 


Respiratory Rate  22   03/03/17 14:57


 


Blood Pressure  140/74   03/03/17 14:57


 


O2 Sat by Pulse Oximetry (%)  93 L  03/03/17 10:05











Constitutional: Yes: Well Nourished, No Distress, Calm


Eyes: Yes: WNL, Conjunctiva Clear, EOM Intact


HENT: Yes: WNL, Atraumatic, Normocephalic


Neck: Yes: WNL, Supple, Trachea Midline


Respiratory: Yes: Diminished, SOB, Wheezes


Gastrointestinal: Yes: WNL, Normal Bowel Sounds


Renal/: Yes: WNL


Cardiovascular: Yes: WNL, Regular Rate and Rhythm


Musculoskeletal: Yes: WNL


Extremities: Yes: WNL


Integumentary: Yes: WNL


Neurological: Yes: WNL, Alert, Oriented


...Motor Strength: WNL


Psychiatric: Yes: WNL, Alert, Oriented





- Other Data


Labs, Other Data: 


 CBC, BMP





 03/01/17 07:15 





 03/01/17 07:15 





 INR, PTT











INR  1.06  (0.82-1.09)   02/28/17  16:06    








 Laboratory Tests











  02/28/17 02/28/17 02/28/17





  16:06 16:06 16:06


 


WBC  11.2 H D  


 


RBC  4.66  


 


Hgb  13.2  D  


 


Hct  39.6  


 


MCV  85.1  


 


MCHC  33.3  


 


RDW  15.2  D  


 


Plt Count  385  


 


MPV  7.6  


 


Neutrophils %  83.7 H  


 


Lymphocytes %  10.2  D  


 


Monocytes %  5.8  


 


Eosinophils %  0.0  


 


Basophils %  0.3  


 


INR   1.06 


 


PTT (Actin FS)   32.0 


 


VBG pH   


 


POC VBG pCO2   


 


POC VBG pO2   


 


Mixed VBG HCO3   


 


Sodium   


 


Potassium   


 


Chloride   


 


Carbon Dioxide   


 


Anion Gap   


 


BUN   


 


Creatinine   


 


Creat Clearance w eGFR   


 


Random Glucose   


 


Lactic Acid   


 


Calcium   


 


Total Bilirubin   


 


AST   


 


ALT   


 


Alkaline Phosphatase   


 


Creatine Kinase   


 


Troponin I   


 


B-Natriuretic Peptide   


 


Total Protein   


 


Albumin   


 


Urine Color    Yellow


 


Urine Appearance    Clear


 


Urine pH    5.0


 


Ur Specific Gravity    1.023


 


Urine Protein    Negative


 


Urine Glucose (UA)    Negative


 


Urine Ketones    Trace H


 


Urine Blood    Negative


 


Urine Nitrite    Negative


 


Urine Bilirubin    Negative


 


Urine Urobilinogen    Negative


 


Ur Leukocyte Esterase    Negative














  02/28/17 02/28/17 02/28/17





  16:06 16:06 17:08


 


WBC   


 


RBC   


 


Hgb   


 


Hct   


 


MCV   


 


MCHC   


 


RDW   


 


Plt Count   


 


MPV   


 


Neutrophils %   


 


Lymphocytes %   


 


Monocytes %   


 


Eosinophils %   


 


Basophils %   


 


INR   


 


PTT (Actin FS)   


 


VBG pH   


 


POC VBG pCO2   


 


POC VBG pO2   


 


Mixed VBG HCO3   


 


Sodium  138  


 


Potassium  4.6  


 


Chloride  95 L  


 


Carbon Dioxide  32  


 


Anion Gap  11  


 


BUN  12  D  


 


Creatinine  0.9  


 


Creat Clearance w eGFR  > 60  


 


Random Glucose  115 H D  


 


Lactic Acid   1.392  1.541


 


Calcium  9.6  


 


Total Bilirubin  0.9  D  


 


AST  20  D  


 


ALT  27  D  


 


Alkaline Phosphatase  206 H D  


 


Creatine Kinase  80  


 


Troponin I  < 0.02  D  


 


B-Natriuretic Peptide  46.19  


 


Total Protein  8.2  D  


 


Albumin  3.8  D  


 


Urine Color   


 


Urine Appearance   


 


Urine pH   


 


Ur Specific Gravity   


 


Urine Protein   


 


Urine Glucose (UA)   


 


Urine Ketones   


 


Urine Blood   


 


Urine Nitrite   


 


Urine Bilirubin   


 


Urine Urobilinogen   


 


Ur Leukocyte Esterase   














  02/28/17 03/01/17 03/01/17





  17:15 07:15 07:15


 


WBC   8.4 


 


RBC   4.35 


 


Hgb   12.3 


 


Hct   37.2 


 


MCV   85.5 


 


MCHC   33.1 


 


RDW   14.7 


 


Plt Count   347 


 


MPV   7.2 L 


 


Neutrophils %   


 


Lymphocytes %   


 


Monocytes %   


 


Eosinophils %   


 


Basophils %   


 


INR   


 


PTT (Actin FS)   


 


VBG pH  7.33  


 


POC VBG pCO2  65.7 H* D  


 


POC VBG pO2  24.9 L  


 


Mixed VBG HCO3  34.3 H  


 


Sodium    137


 


Potassium    4.6


 


Chloride    99


 


Carbon Dioxide    31


 


Anion Gap    7 L


 


BUN    15  D


 


Creatinine    0.8


 


Creat Clearance w eGFR    > 60


 


Random Glucose    124 H


 


Lactic Acid   


 


Calcium    9.3


 


Total Bilirubin    0.6  D


 


AST    15  D


 


ALT    20  D


 


Alkaline Phosphatase    175 H


 


Creatine Kinase   


 


Troponin I   


 


B-Natriuretic Peptide   


 


Total Protein    7.1


 


Albumin    3.2 L


 


Urine Color   


 


Urine Appearance   


 


Urine pH   


 


Ur Specific Gravity   


 


Urine Protein   


 


Urine Glucose (UA)   


 


Urine Ketones   


 


Urine Blood   


 


Urine Nitrite   


 


Urine Bilirubin   


 


Urine Urobilinogen   


 


Ur Leukocyte Esterase   














Imaging





- Results


Chest X-ray: Image Reviewed (emphysema s/p cabg)


EKG: Image Reviewed (sr rep abn)





Assessment/Plan


ashd s/p pci


s/p cabg


copd


chf


hld





Plan


cont present rx


check bnp


cont lasix


cont plavix

## 2017-03-03 NOTE — PN
Teaching Attending Note


Name of Resident: Cris Gomez


ATTENDING PHYSICIAN STATEMENT





I saw and evaluated the patient.


I reviewed the resident's note and discussed the case with the resident.


I agree with the resident's findings and plan as documented.








PULMONARY 








alert,less dyspneic,+cough








IMP ACUTE ON CHRONIC HYPOXEMIC/HYPERCAPNEC RESPIRATORY FAILURE


      COPD EXACERBATION


      ASHD /P CABG,STENTS


      HTN


      HLD





PLAN CONTINUE IV STEROIDS SAME DOSE


        INHALED BRONCHODILATORS


        O2


        BIPAP IF PT DEVELOPES INCREASED RESPIRATORY DISTRESS,HYPERCAPNEA 


        ANTIBIOTICS


        DALIRESP





DR OROPEZA








 Problem List 





- Problems


(1) Acute on chronic respiratory failure with hypoxia and hypercapnia


Code(s): J96.21 - ACUTE AND CHRONIC RESPIRATORY FAILURE WITH HYPOXIA


J96.22 - ACUTE AND CHRONIC RESPIRATORY FAILURE WITH HYPERCAPNIA





(2) COPD exacerbation


Code(s): J44.1 - CHRONIC OBSTRUCTIVE PULMONARY DISEASE W (ACUTE) EXACERBATION





(3) Cough


Code(s): R05 - COUGH





(4) Anxiety


Code(s): F41.9 - ANXIETY DISORDER, UNSPECIFIED





(5) CAD (coronary artery disease)


Code(s): I25.10 - ATHSCL HEART DISEASE OF NATIVE CORONARY ARTERY W/O ANG PCTRS 

  Qualifiers: 


     Coronary Disease-Associated Artery/Lesion type: native artery     Native 

vs. transplanted heart: native heart     Associated angina: without angina     

   Qualified Code(s): I25.10 - Atherosclerotic heart disease of native coronary 

artery without angina pectoris  





(6) COPD (chronic obstructive pulmonary disease)


Code(s): J44.9 - CHRONIC OBSTRUCTIVE PULMONARY DISEASE, UNSPECIFIED   





(7) HTN (hypertension)


Code(s): I10 - ESSENTIAL (PRIMARY) HYPERTENSION   Qualifiers: 


     Hypertension type: essential hypertension        Qualified Code(s): I10 - 

Essential (primary) hypertension  





(8) History of coronary artery bypass graft


Code(s): Z95.1 - PRESENCE OF AORTOCORONARY BYPASS GRAFT














Problem List





- Problems


(1) Acute on chronic respiratory failure with hypoxia and hypercapnia


Code(s): J96.21 - ACUTE AND CHRONIC RESPIRATORY FAILURE WITH HYPOXIA


J96.22 - ACUTE AND CHRONIC RESPIRATORY FAILURE WITH HYPERCAPNIA





(2) COPD exacerbation


Code(s): J44.1 - CHRONIC OBSTRUCTIVE PULMONARY DISEASE W (ACUTE) EXACERBATION





(3) Cough


Code(s): R05 - COUGH





(4) Anxiety


Code(s): F41.9 - ANXIETY DISORDER, UNSPECIFIED





(5) CAD (coronary artery disease)


Code(s): I25.10 - ATHSCL HEART DISEASE OF NATIVE CORONARY ARTERY W/O ANG PCTRS 

  Qualifiers: 


     Coronary Disease-Associated Artery/Lesion type: native artery     Native 

vs. transplanted heart: native heart     Associated angina: without angina     

   Qualified Code(s): I25.10 - Atherosclerotic heart disease of native coronary 

artery without angina pectoris  





(6) COPD (chronic obstructive pulmonary disease)


Code(s): J44.9 - CHRONIC OBSTRUCTIVE PULMONARY DISEASE, UNSPECIFIED   





(7) HTN (hypertension)


Code(s): I10 - ESSENTIAL (PRIMARY) HYPERTENSION   Qualifiers: 


     Hypertension type: essential hypertension        Qualified Code(s): I10 - 

Essential (primary) hypertension  





(8) History of coronary artery bypass graft


Code(s): Z95.1 - PRESENCE OF AORTOCORONARY BYPASS GRAFT

## 2017-03-03 NOTE — PN
Progress Note, Physician


Chief Complaint: 


SOB persists


History of Present Illness: 


On IV steroids and IV antibiotics





- Current Medication List


Current Medications: 


Active Medications





Acetaminophen (Tylenol -)  650 mg PO Q6H PRN


   PRN Reason: FEVER OR PAIN


   Last Admin: 03/01/17 10:40 Dose:  650 mg


Albuterol Sulfate (Ventolin 0.083% Nebulizer Soln -)  1 amp NEB Q4H PRN


   PRN Reason: SHORT OF BREATH/WHEEZING


   Last Admin: 03/03/17 06:35 Dose:  1 amp


Alprazolam (Xanax -)  0.5 mg PO HS PRN


   Last Admin: 03/02/17 20:45 Dose:  0.5 mg


Budesonide/Formoterol Fumarate (Symbicort 80/4.5mcg -)  1 puff IH BID [facility]


   Last Admin: 03/02/17 21:13 Dose:  1 inh


Ceftriaxone Sodium (Rocephin 1gm Ivpb (Pre-Docked))  1 gm IVPB BID [facility]


   Last Admin: 03/02/17 21:13 Dose:  1 gm


Clopidogrel Bisulfate (Plavix -)  75 mg PO DAILY [facility]


   Last Admin: 03/02/17 10:19 Dose:  75 mg


Furosemide (Lasix -)  40 mg PO DAILY [facility]


   Last Admin: 03/02/17 10:18 Dose:  40 mg


Methylprednisolone Sodium Succinate (Solu-Medrol -)  60 mg IVPB Q6H-IV [facility]


   Last Admin: 03/03/17 04:13 Dose:  60 mg


Roflumilast (Daliresp -)  500 mcg PO DAILY [facility]


   Last Admin: 03/02/17 10:18 Dose:  500 mcg


Tiotropium Bromide (Spiriva -)  1 puff IH DAILY [facility]


   Last Admin: 03/02/17 10:19 Dose:  1 pfu











- Objective


Vital Signs: 


 Vital Signs











Temperature  98.3 F   03/03/17 06:00


 


Pulse Rate  101 H  03/03/17 06:00


 


Respiratory Rate  18   03/03/17 06:00


 


Blood Pressure  133/81   03/03/17 06:00


 


O2 Sat by Pulse Oximetry (%)  98   03/02/17 21:00











Constitutional: Yes: Mild Distress


Eyes: Yes: WNL


HENT: Yes: WNL


Neck: Yes: WNL


Cardiovascular: Yes: WNL


Respiratory: Yes: On Nasal O2, Poor Air Entry


Gastrointestinal: Yes: WNL


...Rectal Exam: Yes: Deferred


Genitourinary: Yes: WNL


Musculoskeletal: Yes: WNL


Edema: LUE: Trace, RUE: Trace, LLE: Trace, RLE: Trace


Neurological: Yes: Alert


Psychiatric: Yes: WNL


Labs: 


 CBC, BMP





 03/01/17 07:15 





 03/01/17 07:15 





 INR, PTT











INR  1.06  (0.82-1.09)   02/28/17  16:06    














Assessment/Plan


Continue same trt

## 2017-03-03 NOTE — PN
Physical Exam: 


SUBJECTIVE: Patient seen and examined





Patient resting in bed comfortably NAD. No acute events. afebrile and 

hemodynamically stable. 97% on 3 L. States he feels a little less sob and less 

cough. clearer sputum. walked to  w/o O2 with sob. Denies f/c, chest 

pain, abd pain, n/v, h/a, diarrhea, dysuria. 





OBJECTIVE:





 Vital Signs











 Period  Temp  Pulse  Resp  BP Sys/Baer  Pulse Ox


 


 Last 24 Hr  97.9 F-98.3 F    16-22  124-140/74-89  93-98











GENERAL: Awake, alert, and fully oriented, in no acute distress.


HEAD: Normal with no signs of trauma.


EYES: Pupils equal, round and reactive to light, extraocular movements intact, 

sclera anicteric, conjunctiva clear. 


EARS, NOSE, THROAT: Moist mucous membranes.


NECK: supple without JVD


LUNGS: slightly improved air movement, diffuse wheezes


HEART: Regular rate and rhythm, normal S1 and S2 


ABDOMEN: Soft, nontender, not distended, normoactive bowel sounds


MUSCULOSKELETAL: No CVA tenderness.


UPPER EXTREMITIES: No peripheral edema.


LOWER EXTREMITIES: No peripheral edema. 


NEUROLOGICAL:  Cranial nerves II-XII grossly intact. Normal speech. 


PSYCHIATRIC: Cooperative. Good eye contact. Appropriate mood and affect.


SKIN: Warm, dry














Active Medications











Generic Name Dose Route Start Last Admin





  Trade Name Freq  PRN Reason Stop Dose Admin


 


Acetaminophen  650 mg 02/28/17 21:11 03/01/17 10:40





  Tylenol -  PO   650 mg





  Q6H PRN   Administration





  FEVER OR PAIN   


 


Albuterol Sulfate  1 amp 03/01/17 14:03 03/03/17 14:00





  Ventolin 0.083% Nebulizer Soln -  NEB   1 amp





  Q4H PRN   Administration





  SHORT OF BREATH/WHEEZING   


 


Alprazolam  0.5 mg 02/28/17 21:13 03/02/17 20:45





  Xanax -  PO   0.5 mg





  HS PRN   Administration


 


Alprazolam  0.5 mg 03/03/17 15:48  





  Xanax -  PO   





  TID PRN   





  ANXIETY   


 


Budesonide/Formoterol Fumarate  1 puff 02/28/17 22:00 03/03/17 10:15





  Symbicort 80/4.5mcg -  IH   1 inh





  BID [facility]   Administration


 


Clopidogrel Bisulfate  75 mg 03/01/17 10:00 03/03/17 10:13





  Plavix -  PO   75 mg





  DAILY [facility]   Administration


 


Furosemide  40 mg 03/01/17 10:00 03/03/17 10:13





  Lasix -  PO   40 mg





  DAILY [facility]   Administration


 


Methylprednisolone Sodium Succinate  60 mg 03/01/17 15:00 03/03/17 15:26





  Solu-Medrol -  IVPB   60 mg





  Q6H-IV [facility]   Administration


 


Roflumilast  500 mcg 03/01/17 14:15 03/03/17 10:15





  Daliresp -  PO   500 mcg





  DAILY [facility]   Administration


 


Tiotropium Bromide  1 puff 03/01/17 14:15 03/03/17 10:14





  Spiriva -  IH   1 pfu





  DAILY [facility]   Administration











ASSESSMENT/PLAN:


Acute on chonic COPD exacerbation with hypoxemic, hypercarbic respiratory 

failure 


-possibly superimposed PNA 


-CXR unremarkable


-noncompliant with home O2 (supposed to wear continuously) 


-slight clinical improvement 


-Medrol 60 q 6 d 3  


-azithromycin, rocephin day 4


-daliresp, spiriva


-symbicort, albuterol


-supplemental O2 NC, possible BIPAP if desats 


-will require pulmonary rehab     





CAD


-s/p PCI, CABG


-No apparent acute events/exacerbation 


-regular outpatient cardiac f/u     





HTN


-lasix       





HLD


-continue home meds 





Dispo: We will continue to follow the patient. Thank you for this consultative 

opportunity.





Problem List





- Problems


(1) Acute on chronic respiratory failure with hypoxemia


Code(s): J96.21 - ACUTE AND CHRONIC RESPIRATORY FAILURE WITH HYPOXIA





(2) Acute on chronic respiratory failure with hypoxia and hypercapnia


Code(s): J96.21 - ACUTE AND CHRONIC RESPIRATORY FAILURE WITH HYPOXIA


J96.22 - ACUTE AND CHRONIC RESPIRATORY FAILURE WITH HYPERCAPNIA





(3) COPD exacerbation


Code(s): J44.1 - CHRONIC OBSTRUCTIVE PULMONARY DISEASE W (ACUTE) EXACERBATION





(4) Cough


Code(s): R05 - COUGH





(5) Abnormal LFTs


Code(s): R79.89 - OTHER SPECIFIED ABNORMAL FINDINGS OF BLOOD CHEMISTRY





(6) Anxiety


Code(s): F41.9 - ANXIETY DISORDER, UNSPECIFIED





(7) CAD (coronary artery disease)


Code(s): I25.10 - ATHSCL HEART DISEASE OF NATIVE CORONARY ARTERY W/O ANG PCTRS 

  Qualifiers: 


     Coronary Disease-Associated Artery/Lesion type: native artery     Native 

vs. transplanted heart: native heart     Associated angina: without angina     

   Qualified Code(s): I25.10 - Atherosclerotic heart disease of native coronary 

artery without angina pectoris  





(8) COPD (chronic obstructive pulmonary disease)


Code(s): J44.9 - CHRONIC OBSTRUCTIVE PULMONARY DISEASE, UNSPECIFIED   





(9) HTN (hypertension)


Code(s): I10 - ESSENTIAL (PRIMARY) HYPERTENSION   Qualifiers: 


     Hypertension type: essential hypertension        Qualified Code(s): I10 - 

Essential (primary) hypertension  





(10) History of coronary artery bypass graft


Code(s): Z95.1 - PRESENCE OF AORTOCORONARY BYPASS GRAFT





(11) History of percutaneous coronary intervention


Code(s): Z98.89 - OTHER SPECIFIED POSTPROCEDURAL STATES * DO NOT USE *





(12) Hypercholesterolemia


Code(s): E78.0 - PURE HYPERCHOLESTEROLEMIA * DO NOT USE *





(13) Pneumonia


Code(s): J18.9 - PNEUMONIA, UNSPECIFIED ORGANISM   








Visit type





- Emergency Visit


Emergency Visit: Yes


ED Registration Date: 02/28/17


Care time: The patient presented to the Emergency Department on the above date 

and was hospitalized for further evaluation of their emergent condition.





- New Patient


This patient is new to me today: No





- Critical Care


Critical Care patient: No





- Discharge Referral


Referred to [facility] No

## 2017-03-04 NOTE — PN
Progress Note, Physician


History of Present Illness: 


Still C/O SOB





- Current Medication List


Current Medications: 


Active Medications





Acetaminophen (Tylenol -)  650 mg PO Q6H PRN


   PRN Reason: FEVER OR PAIN


   Last Admin: 03/01/17 10:40 Dose:  650 mg


Albuterol Sulfate (Ventolin 0.083% Nebulizer Soln -)  1 amp NEB Q4H PRN


   PRN Reason: SHORT OF BREATH/WHEEZING


   Last Admin: 03/04/17 05:44 Dose:  1 amp


Alprazolam (Xanax -)  0.5 mg PO TID PRN


   PRN Reason: ANXIETY


   Last Admin: 03/03/17 16:00 Dose:  0.5 mg


Budesonide/Formoterol Fumarate (Symbicort 80/4.5mcg -)  1 puff IH BID [facility]


   Last Admin: 03/03/17 20:59 Dose:  1 inh


Clopidogrel Bisulfate (Plavix -)  75 mg PO DAILY [facility]


   Last Admin: 03/03/17 10:13 Dose:  75 mg


Furosemide (Lasix -)  40 mg PO DAILY [facility]


   Last Admin: 03/03/17 10:13 Dose:  40 mg


Methylprednisolone Sodium Succinate (Solu-Medrol -)  60 mg IVPB Q6H-IV [facility]


   Last Admin: 03/04/17 04:15 Dose:  60 mg


Roflumilast (Daliresp -)  500 mcg PO DAILY [facility]


   Last Admin: 03/03/17 10:15 Dose:  500 mcg


Tiotropium Bromide (Spiriva -)  1 puff IH DAILY [facility]


   Last Admin: 03/03/17 10:14 Dose:  1 pfu











- Objective


Vital Signs: 


 Vital Signs











Temperature  97.9 F   03/04/17 06:00


 


Pulse Rate  105 H  03/04/17 06:00


 


Respiratory Rate  22   03/04/17 06:00


 


Blood Pressure  149/83   03/04/17 06:00


 


O2 Sat by Pulse Oximetry (%)  93 L  03/03/17 21:00











Constitutional: Yes: Mild Distress


Eyes: Yes: WNL


HENT: Yes: WNL


Neck: Yes: WNL


Cardiovascular: Yes: WNL


Respiratory: Yes: Poor Air Entry


Gastrointestinal: Yes: WNL


...Rectal Exam: Yes: Deferred


Genitourinary: Yes: WNL


Breast(s): Yes: WNL


Musculoskeletal: Yes: WNL


Extremities: Yes: WNL


Edema: Yes


Edema: LUE: Trace, RUE: Trace, LLE: Trace, RLE: Trace


Peripheral Pulses WNL: Yes


Neurological: Yes: Alert


Labs: 


 CBC, BMP





 03/01/17 07:15 





 03/01/17 07:15 





 INR, PTT











INR  1.06  (0.82-1.09)   02/28/17  16:06

## 2017-03-04 NOTE — PN
Progress Note, Physician


History of Present Illness: 


pulmonary





alert,less dyspneic





- Current Medication List


Current Medications: 


Active Medications





Acetaminophen (Tylenol -)  650 mg PO Q6H PRN


   PRN Reason: FEVER OR PAIN


   Last Admin: 03/01/17 10:40 Dose:  650 mg


Albuterol Sulfate (Ventolin 0.083% Nebulizer Soln -)  1 amp NEB Q4H PRN


   PRN Reason: SHORT OF BREATH/WHEEZING


   Last Admin: 03/04/17 05:44 Dose:  1 amp


Alprazolam (Xanax -)  0.5 mg PO TID PRN


   PRN Reason: ANXIETY


   Last Admin: 03/04/17 09:03 Dose:  0.5 mg


Budesonide/Formoterol Fumarate (Symbicort 80/4.5mcg -)  1 puff IH BID [facility]


   Last Admin: 03/04/17 09:10 Dose:  1 inh


Clopidogrel Bisulfate (Plavix -)  75 mg PO DAILY [facility]


   Last Admin: 03/04/17 09:03 Dose:  75 mg


Furosemide (Lasix -)  40 mg PO DAILY [facility]


   Last Admin: 03/04/17 09:03 Dose:  40 mg


Methylprednisolone Sodium Succinate (Solu-Medrol -)  60 mg IVPB Q6H-IV [facility]


   Last Admin: 03/04/17 09:03 Dose:  60 mg


Roflumilast (Daliresp -)  500 mcg PO DAILY [facility]


   Last Admin: 03/04/17 09:10 Dose:  500 mcg


Tiotropium Bromide (Spiriva -)  1 puff IH DAILY [facility]


   Last Admin: 03/04/17 09:10 Dose:  1 pfu











- Objective


Vital Signs: 


 Vital Signs











Temperature  97.7 F   03/04/17 09:00


 


Pulse Rate  120 H  03/04/17 09:00


 


Respiratory Rate  22   03/04/17 09:00


 


Blood Pressure  145/82   03/04/17 09:00


 


O2 Sat by Pulse Oximetry (%)  97   03/04/17 09:00











Constitutional: Yes: Well Nourished, Calm


Eyes: Yes: WNL


HENT: Yes: WNL


Neck: Yes: WNL


Cardiovascular: Yes: Regular Rate and Rhythm, S1, S2


Respiratory: Yes: Rhonchi (genaro rhonchi)


Gastrointestinal: Yes: Normal Bowel Sounds, Soft


Extremities: Yes: WNL


Edema: Yes


Labs: 


 CBC, BMP





 03/01/17 07:15 





 03/01/17 07:15 





 INR, PTT











INR  1.06  (0.82-1.09)   02/28/17  16:06    














Problem List





- Problems


(1) Acute on chronic respiratory failure with hypoxia and hypercapnia


Code(s): J96.21 - ACUTE AND CHRONIC RESPIRATORY FAILURE WITH HYPOXIA


J96.22 - ACUTE AND CHRONIC RESPIRATORY FAILURE WITH HYPERCAPNIA





(2) COPD exacerbation


Code(s): J44.1 - CHRONIC OBSTRUCTIVE PULMONARY DISEASE W (ACUTE) EXACERBATION





(3) Cough


Code(s): R05 - COUGH





(4) Anxiety


Code(s): F41.9 - ANXIETY DISORDER, UNSPECIFIED





(5) CAD (coronary artery disease)


Code(s): I25.10 - ATHSCL HEART DISEASE OF NATIVE CORONARY ARTERY W/O ANG PCTRS 

  Qualifiers: 


     Coronary Disease-Associated Artery/Lesion type: native artery     Native 

vs. transplanted heart: native heart     Associated angina: without angina     

   Qualified Code(s): I25.10 - Atherosclerotic heart disease of native coronary 

artery without angina pectoris  





(6) COPD (chronic obstructive pulmonary disease)


Code(s): J44.9 - CHRONIC OBSTRUCTIVE PULMONARY DISEASE, UNSPECIFIED   





(7) HTN (hypertension)


Code(s): I10 - ESSENTIAL (PRIMARY) HYPERTENSION   Qualifiers: 


     Hypertension type: essential hypertension        Qualified Code(s): I10 - 

Essential (primary) hypertension  





(8) History of coronary artery bypass graft


Code(s): Z95.1 - PRESENCE OF AORTOCORONARY BYPASS GRAFT








Assessment/Plan


IMP ACUTE ON CHRONIC HYPOXEMIC/HYPERCAPNEC RESPIRATORY FAILURE


      COPD EXACERBATION


      ASHD /P CABG,STENTS


      HTN


      HLD





PLAN CONTINUE IV STEROIDS 


        INHALED BRONCHODILATORS


        O2


        BIPAP IF PT DEVELOPES INCREASED RESPIRATORY DISTRESS,HYPERCAPNEA 


        ANTIBIOTICS


        DALIRESP





DR OROPEZA








 Problem List 





- Problems


(1) Acute on chronic respiratory failure with hypoxia and hypercapnia


Code(s): J96.21 - ACUTE AND CHRONIC RESPIRATORY FAILURE WITH HYPOXIA


J96.22 - ACUTE AND CHRONIC RESPIRATORY FAILURE WITH HYPERCAPNIA





(2) COPD exacerbation


Code(s): J44.1 - CHRONIC OBSTRUCTIVE PULMONARY DISEASE W (ACUTE) EXACERBATION





(3) Cough


Code(s): R05 - COUGH





(4) Anxiety


Code(s): F41.9 - ANXIETY DISORDER, UNSPECIFIED





(5) CAD (coronary artery disease)


Code(s): I25.10 - ATHSCL HEART DISEASE OF NATIVE CORONARY ARTERY W/O ANG PCTRS 

  Qualifiers: 


     Coronary Disease-Associated Artery/Lesion type: native artery     Native 

vs. transplanted heart: native heart     Associated angina: without angina     

   Qualified Code(s): I25.10 - Atherosclerotic heart disease of native coronary 

artery without angina pectoris  





(6) COPD (chronic obstructive pulmonary disease)


Code(s): J44.9 - CHRONIC OBSTRUCTIVE PULMONARY DISEASE, UNSPECIFIED   





(7) HTN (hypertension)


Code(s): I10 - ESSENTIAL (PRIMARY) HYPERTENSION   Qualifiers: 


     Hypertension type: essential hypertension        Qualified Code(s): I10 - 

Essential (primary) hypertension  





(8) History of coronary artery bypass graft


Code(s): Z95.1 - PRESENCE OF AORTOCORONARY BYPASS GRAFT

## 2017-03-04 NOTE — PN
Progress Note, Physician


History of Present Illness: 


This is a 58 yo m with PMH of COPD on 2L at home (noncompliant), past heavy 

smoker, asthma, CAD s/p PCI, CABG 2014, HTN, HLD, who presents due to SOB that 

started 4 days ago and has gotten worse. He reports increased cough productive 

of yellow flem, chills and diarrhea. His PCP prescribed him a Z Pack past fri, 

which helped loosen his respiratory secretions but didnt improve his sob. He 

currently feels unimproved from presentation. He is able to ambulate to 

bathroom on 2 L O2. He denies f/c, n/v, chest pain, abd pain, h/a, rhinorrhea 

or throat pain. Denies sick contacts. He smoked 1.5 packs/day (quit) and worked 

as a . 








   CABG SVG to RCA   Dec. 2014   Dr. Duarte      


 


Medical History      Reviewed


Condition   Date   Treating Physician   Comments


Abnormal stress test showing inferior wall ischemia   October 13, 2014   [facility]      


CAD- Stent o RCA   2008   [facility]      


COPD            


 oRCA prior stent site, o/w nonobstructive ds   Oct. 2014   Dr. France 

[facility]      


Rheumatoid Arthritis            


unsucesfull  attempt   Dec. 3 2014   [facility]      


 





- Current Medication List


Current Medications: 


Active Medications





Acetaminophen (Tylenol -)  650 mg PO Q6H PRN


   PRN Reason: FEVER OR PAIN


   Last Admin: 03/01/17 10:40 Dose:  650 mg


Albuterol Sulfate (Ventolin 0.083% Nebulizer Soln -)  1 amp NEB Q4H PRN


   PRN Reason: SHORT OF BREATH/WHEEZING


   Last Admin: 03/04/17 05:44 Dose:  1 amp


Alprazolam (Xanax -)  0.5 mg PO TID PRN


   PRN Reason: ANXIETY


   Last Admin: 03/03/17 16:00 Dose:  0.5 mg


Budesonide/Formoterol Fumarate (Symbicort 80/4.5mcg -)  1 puff IH BID [facility]


   Last Admin: 03/03/17 20:59 Dose:  1 inh


Clopidogrel Bisulfate (Plavix -)  75 mg PO DAILY [facility]


   Last Admin: 03/03/17 10:13 Dose:  75 mg


Furosemide (Lasix -)  40 mg PO DAILY [facility]


   Last Admin: 03/03/17 10:13 Dose:  40 mg


Methylprednisolone Sodium Succinate (Solu-Medrol -)  60 mg IVPB Q6H-IV [facility]


   Last Admin: 03/04/17 04:15 Dose:  60 mg


Roflumilast (Daliresp -)  500 mcg PO DAILY [facility]


   Last Admin: 03/03/17 10:15 Dose:  500 mcg


Tiotropium Bromide (Spiriva -)  1 puff IH DAILY [facility]


   Last Admin: 03/03/17 10:14 Dose:  1 pfu











- Objective


Vital Signs: 


 Vital Signs











Temperature  97.9 F   03/04/17 06:00


 


Pulse Rate  105 H  03/04/17 06:00


 


Respiratory Rate  22   03/04/17 06:00


 


Blood Pressure  149/83   03/04/17 06:00


 


O2 Sat by Pulse Oximetry (%)  93 L  03/03/17 21:00











Eyes: Yes: WNL, Conjunctiva Clear, EOM Intact


HENT: Yes: WNL, Atraumatic, Normocephalic


Neck: Yes: WNL, Supple, Trachea Midline


Cardiovascular: Yes: WNL, Regular Rate and Rhythm


Respiratory: Yes: WNL, Poor Air Entry, SOB, Wheezes


Gastrointestinal: Yes: WNL, Normal Bowel Sounds


Genitourinary: Yes: WNL


Musculoskeletal: Yes: WNL


Extremities: Yes: WNL


Edema: No


Edema: LLE: 1+, RLE: 1+


Integumentary: Yes: WNL


Neurological: Yes: WNL, Alert, Oriented


...Motor Strength: WNL


Psychiatric: Yes: WNL


Labs: 


 CBC, BMP





 03/01/17 07:15 





 03/01/17 07:15 





 INR, PTT











INR  1.06  (0.82-1.09)   02/28/17  16:06    














Assessment/Plan


ashd s/p pci


s/p cabg


copd


chf


hld





Plan


cont present rx


check bnp


cont lasix


cont plavix

## 2017-03-05 NOTE — PN
Progress Note, Physician


History of Present Illness: 


This is a 58 yo m with PMH of COPD on 2L at home (noncompliant), past heavy 

smoker, asthma, CAD s/p PCI, CABG 2014, HTN, HLD, who presents due to SOB that 

started 4 days ago and has gotten worse. He reports increased cough productive 

of yellow flem, chills and diarrhea. His PCP prescribed him a Z Pack past fri, 

which helped loosen his respiratory secretions but didnt improve his sob. He 

currently feels unimproved from presentation. He is able to ambulate to 

bathroom on 2 L O2. He denies f/c, n/v, chest pain, abd pain, h/a, rhinorrhea 

or throat pain. Denies sick contacts. He smoked 1.5 packs/day (quit) and worked 

as a . 








   CABG SVG to RCA   Dec. 2014   Dr. Duarte      


 


Medical History      Reviewed


Condition   Date   Treating Physician   Comments


Abnormal stress test showing inferior wall ischemia   October 13, 2014   [facility]      


CAD- Stent o RCA   2008   [facility]      


COPD            


 oRCA prior stent site, o/w nonobstructive ds   Oct. 2014   Dr. France 

[facility]      


Rheumatoid Arthritis            


unsucesfull  attempt   Dec. 3 2014   [facility]      


 





- Current Medication List


Current Medications: 


Active Medications





Acetaminophen (Tylenol -)  650 mg PO Q6H PRN


   PRN Reason: FEVER OR PAIN


   Last Admin: 03/01/17 10:40 Dose:  650 mg


Albuterol Sulfate (Ventolin 0.083% Nebulizer Soln -)  1 amp NEB Q4H PRN


   PRN Reason: SHORT OF BREATH/WHEEZING


   Last Admin: 03/04/17 22:20 Dose:  1 amp


Alprazolam (Xanax -)  0.5 mg PO TID PRN


   PRN Reason: ANXIETY


   Last Admin: 03/04/17 22:31 Dose:  0.5 mg


Budesonide/Formoterol Fumarate (Symbicort 80/4.5mcg -)  1 puff IH BID [facility]


   Last Admin: 03/05/17 09:08 Dose:  1 inh


Clopidogrel Bisulfate (Plavix -)  75 mg PO DAILY [facility]


   Last Admin: 03/05/17 09:07 Dose:  75 mg


Furosemide (Lasix -)  40 mg PO DAILY [facility]


   Last Admin: 03/05/17 09:06 Dose:  40 mg


Methylprednisolone Sodium Succinate (Solu-Medrol -)  60 mg IVPB Q6H-IV [facility]


   Last Admin: 03/05/17 09:07 Dose:  60 mg


Roflumilast (Daliresp -)  500 mcg PO DAILY [facility]


   Last Admin: 03/05/17 09:07 Dose:  500 mcg


Tiotropium Bromide (Spiriva -)  1 puff IH DAILY [facility]


   Last Admin: 03/05/17 09:07 Dose:  1 pfu











- Objective


Vital Signs: 


 Vital Signs











Temperature  97.6 F   03/05/17 09:16


 


Pulse Rate  108 H  03/05/17 09:16


 


Respiratory Rate  24   03/05/17 09:16


 


Blood Pressure  120/90   03/05/17 09:16


 


O2 Sat by Pulse Oximetry (%)  100   03/04/17 21:00











Eyes: Yes: WNL, Conjunctiva Clear, EOM Intact


HENT: Yes: WNL, Atraumatic, Normocephalic


Neck: Yes: WNL, Supple, Trachea Midline


Cardiovascular: Yes: WNL, Regular Rate and Rhythm


Respiratory: Yes: WNL, Regular, CTA Bilaterally


Gastrointestinal: Yes: WNL, Normal Bowel Sounds


Genitourinary: Yes: WNL


Musculoskeletal: Yes: WNL


Extremities: Yes: WNL


Edema: No


Integumentary: Yes: WNL


Neurological: Yes: WNL, Alert, Oriented


...Motor Strength: WNL


Psychiatric: Yes: WNL


Labs: 


 CBC, BMP





 03/01/17 07:15 





 03/01/17 07:15 





 INR, PTT











INR  1.06  (0.82-1.09)   02/28/17  16:06

## 2017-03-05 NOTE — PN
Progress Note, Physician


Chief Complaint: 


SOB


History of Present Illness: 


Case discussed with Dr Cain ,patient may need pulmonery rehab





- Current Medication List


Current Medications: 


Active Medications





Acetaminophen (Tylenol -)  650 mg PO Q6H PRN


   PRN Reason: FEVER OR PAIN


   Last Admin: 03/01/17 10:40 Dose:  650 mg


Albuterol Sulfate (Ventolin 0.083% Nebulizer Soln -)  1 amp NEB Q4H PRN


   PRN Reason: SHORT OF BREATH/WHEEZING


   Last Admin: 03/04/17 22:20 Dose:  1 amp


Alprazolam (Xanax -)  0.5 mg PO TID PRN


   PRN Reason: ANXIETY


   Last Admin: 03/04/17 22:31 Dose:  0.5 mg


Budesonide/Formoterol Fumarate (Symbicort 80/4.5mcg -)  1 puff IH BID [facility]


   Last Admin: 03/05/17 09:08 Dose:  1 inh


Clopidogrel Bisulfate (Plavix -)  75 mg PO DAILY [facility]


   Last Admin: 03/05/17 09:07 Dose:  75 mg


Furosemide (Lasix -)  40 mg PO DAILY [facility]


   Last Admin: 03/05/17 09:06 Dose:  40 mg


Levofloxacin (Levaquin 500 Mg Premixed Ivpb -)  100 mls @ 100 mls/hr IVPB DAILY 

[facility]


Methylprednisolone Sodium Succinate (Solu-Medrol -)  40 mg IVPB Q8H-IV [facility]


Roflumilast (Daliresp -)  500 mcg PO DAILY [facility]


   Last Admin: 03/05/17 09:07 Dose:  500 mcg


Tiotropium Bromide (Spiriva -)  1 puff IH DAILY [facility]


   Last Admin: 03/05/17 09:07 Dose:  1 pfu











- Objective


Vital Signs: 


 Vital Signs











Temperature  97.6 F   03/05/17 09:16


 


Pulse Rate  108 H  03/05/17 09:16


 


Respiratory Rate  24   03/05/17 09:16


 


Blood Pressure  120/90   03/05/17 09:16


 


O2 Sat by Pulse Oximetry (%)  95   03/05/17 09:00











Constitutional: Yes: Mild Distress, Pallor


HENT: Yes: WNL


Neck: Yes: WNL


Cardiovascular: Yes: WNL


Respiratory: Yes: On Nasal O2


Gastrointestinal: Yes: WNL


...Rectal Exam: Yes: Deferred


Genitourinary: Yes: WNL


Edema: LLE: 1+, RLE: 1+


Labs: 


 CBC, BMP





 03/01/17 07:15 





 03/01/17 07:15 





 INR, PTT











INR  1.06  (0.82-1.09)   02/28/17  16:06    














Assessment/Plan


Continue same trt

## 2017-03-05 NOTE — PN
Progress Note, Physician


History of Present Illness: 


PULMONARY





ALERT,LESS CONGESTED,STILL VERY DYSPNEIC WITH MIN EXERTION





- Current Medication List


Current Medications: 


Active Medications





Acetaminophen (Tylenol -)  650 mg PO Q6H PRN


   PRN Reason: FEVER OR PAIN


   Last Admin: 03/01/17 10:40 Dose:  650 mg


Albuterol Sulfate (Ventolin 0.083% Nebulizer Soln -)  1 amp NEB Q4H PRN


   PRN Reason: SHORT OF BREATH/WHEEZING


   Last Admin: 03/04/17 22:20 Dose:  1 amp


Alprazolam (Xanax -)  0.5 mg PO TID PRN


   PRN Reason: ANXIETY


   Last Admin: 03/04/17 22:31 Dose:  0.5 mg


Budesonide/Formoterol Fumarate (Symbicort 80/4.5mcg -)  1 puff IH BID [facility]


   Last Admin: 03/05/17 09:08 Dose:  1 inh


Clopidogrel Bisulfate (Plavix -)  75 mg PO DAILY [facility]


   Last Admin: 03/05/17 09:07 Dose:  75 mg


Furosemide (Lasix -)  40 mg PO DAILY [facility]


   Last Admin: 03/05/17 09:06 Dose:  40 mg


Methylprednisolone Sodium Succinate (Solu-Medrol -)  60 mg IVPB Q6H-IV [facility]


   Last Admin: 03/05/17 09:07 Dose:  60 mg


Roflumilast (Daliresp -)  500 mcg PO DAILY [facility]


   Last Admin: 03/05/17 09:07 Dose:  500 mcg


Tiotropium Bromide (Spiriva -)  1 puff IH DAILY [facility]


   Last Admin: 03/05/17 09:07 Dose:  1 pfu











- Objective


Vital Signs: 


 Vital Signs











Temperature  97.6 F   03/05/17 09:16


 


Pulse Rate  108 H  03/05/17 09:16


 


Respiratory Rate  24   03/05/17 09:16


 


Blood Pressure  120/90   03/05/17 09:16


 


O2 Sat by Pulse Oximetry (%)  95   03/05/17 09:00











Constitutional: Yes: Well Nourished, Calm


Eyes: Yes: WNL


HENT: Yes: WNL


Neck: Yes: WNL


Cardiovascular: Yes: Regular Rate and Rhythm, S1, S2


Respiratory: Yes: Rhonchi (ZENAIDA RHONCHI)


Gastrointestinal: Yes: Normal Bowel Sounds, Soft


Extremities: Yes: WNL


Edema: Yes


Labs: 


 CBC, BMP








Problem List





- Problems


(1) Acute on chronic respiratory failure with hypoxia and hypercapnia


Code(s): J96.21 - ACUTE AND CHRONIC RESPIRATORY FAILURE WITH HYPOXIA


J96.22 - ACUTE AND CHRONIC RESPIRATORY FAILURE WITH HYPERCAPNIA





(2) COPD exacerbation


Code(s): J44.1 - CHRONIC OBSTRUCTIVE PULMONARY DISEASE W (ACUTE) EXACERBATION





(3) Cough


Code(s): R05 - COUGH





(4) Anxiety


Code(s): F41.9 - ANXIETY DISORDER, UNSPECIFIED





(5) CAD (coronary artery disease)


Code(s): I25.10 - ATHSCL HEART DISEASE OF NATIVE CORONARY ARTERY W/O ANG PCTRS 

  Qualifiers: 


     Coronary Disease-Associated Artery/Lesion type: native artery     Native 

vs. transplanted heart: native heart     Associated angina: without angina     

   Qualified Code(s): I25.10 - Atherosclerotic heart disease of native coronary 

artery without angina pectoris  





(6) COPD (chronic obstructive pulmonary disease)


Code(s): J44.9 - CHRONIC OBSTRUCTIVE PULMONARY DISEASE, UNSPECIFIED   





(7) HTN (hypertension)


Code(s): I10 - ESSENTIAL (PRIMARY) HYPERTENSION   Qualifiers: 


     Hypertension type: essential hypertension        Qualified Code(s): I10 - 

Essential (primary) hypertension  





(8) History of coronary artery bypass graft


Code(s): Z95.1 - PRESENCE OF AORTOCORONARY BYPASS GRAFT








Assessment/Plan


IMP ACUTE ON CHRONIC HYPOXEMIC/HYPERCAPNEC RESPIRATORY FAILURE


      COPD EXACERBATION


      ASHD /P CABG,STENTS


      HTN


      HLD





PLAN TAPER  STEROIDS 


        INHALED BRONCHODILATORS


        O2


        BIPAP IF PT DEVELOPES INCREASED RESPIRATORY DISTRESS,HYPERCAPNEA 


        ELLA OROPEZA








 Problem List 





- Problems


(1) Acute on chronic respiratory failure with hypoxia and hypercapnia


Code(s): J96.21 - ACUTE AND CHRONIC RESPIRATORY FAILURE WITH HYPOXIA


J96.22 - ACUTE AND CHRONIC RESPIRATORY FAILURE WITH HYPERCAPNIA





(2) COPD exacerbation


Code(s): J44.1 - CHRONIC OBSTRUCTIVE PULMONARY DISEASE W (ACUTE) EXACERBATION





(3) Cough


Code(s): R05 - COUGH





(4) Anxiety


Code(s): F41.9 - ANXIETY DISORDER, UNSPECIFIED





(5) CAD (coronary artery disease)


Code(s): I25.10 - ATHSCL HEART DISEASE OF NATIVE CORONARY ARTERY W/O ANG PCTRS 

  Qualifiers: 


     Coronary Disease-Associated Artery/Lesion type: native artery     Native 

vs. transplanted heart: native heart     Associated angina: without angina     

   Qualified Code(s): I25.10 - Atherosclerotic heart disease of native coronary 

artery without angina pectoris  





(6) COPD (chronic obstructive pulmonary disease)


Code(s): J44.9 - CHRONIC OBSTRUCTIVE PULMONARY DISEASE, UNSPECIFIED   





(7) HTN (hypertension)


Code(s): I10 - ESSENTIAL (PRIMARY) HYPERTENSION   Qualifiers: 


     Hypertension type: essential hypertension        Qualified Code(s): I10 - 

Essential (primary) hypertension  





(8) History of coronary artery bypass graft


Code(s): Z95.1 - PRESENCE OF AORTOCORONARY BYPASS GRAFT

## 2017-03-06 NOTE — PN
Physical Exam: 


SUBJECTIVE: Patient seen and examined





Patient resting in bed comfortably NAD. No acute events. afebrile and 

hemodynamically stable. 98% on 3 L NC. States he feels a little better. Reduced 

cough and clearer sputum. Ambulating with O2. States he feels below his 

baseline 1 year ago. Denies f/c, chest pain, abd pain, n/v, h/a, diarrhea, 

dysuria. 





OBJECTIVE:





 Vital Signs











 Period  Temp  Pulse  Resp  BP Sys/Baer  Pulse Ox


 


 Last 24 Hr  97 F-97.8 F  106-120  16-22  126-136/79-95  95-99











GENERAL: Awake, alert, and fully oriented, in no acute distress.


HEAD: Normal with no signs of trauma.


EYES: Pupils equal, round and reactive to light, extraocular movements intact, 

sclera anicteric, conjunctiva clear. 


EARS, NOSE, THROAT: Moist mucous membranes.


NECK: supple without JVD


LUNGS: slightly improved air movement, no wheeze


HEART: Regular rate and rhythm, normal S1 and S2 


ABDOMEN: Soft, nontender, not distended, normoactive bowel sounds


MUSCULOSKELETAL: No CVA tenderness.


UPPER EXTREMITIES: No peripheral edema.


LOWER EXTREMITIES: No peripheral edema. 


NEUROLOGICAL:  Cranial nerves II-XII grossly intact. Normal speech. 


PSYCHIATRIC: Cooperative. Good eye contact. Appropriate mood and affect.


SKIN: Warm, dry














Active Medications











Generic Name Dose Route Start Last Admin





  Trade Name Freq  PRN Reason Stop Dose Admin


 


Acetaminophen  650 mg 02/28/17 21:11 03/01/17 10:40





  Tylenol -  PO   650 mg





  Q6H PRN   Administration





  FEVER OR PAIN   


 


Albuterol Sulfate  1 amp 03/01/17 14:03 03/06/17 10:54





  Ventolin 0.083% Nebulizer Soln -  NEB   1 amp





  Q4H PRN   Administration





  SHORT OF BREATH/WHEEZING   


 


Alprazolam  0.5 mg 03/03/17 15:48 03/06/17 10:28





  Xanax -  PO   0.5 mg





  TID PRN   Administration





  ANXIETY   


 


Budesonide/Formoterol Fumarate  1 puff 02/28/17 22:00 03/06/17 10:10





  Symbicort 80/4.5mcg -  IH   1 inh





  BID [facility]   Administration


 


Clopidogrel Bisulfate  75 mg 03/01/17 10:00 03/06/17 10:11





  Plavix -  PO   75 mg





  DAILY [facility]   Administration


 


Furosemide  40 mg 03/01/17 10:00 03/06/17 10:11





  Lasix -  PO   40 mg





  DAILY [facility]   Administration


 


Levofloxacin  100 mls @ 100 mls/hr 03/05/17 13:30 03/06/17 10:28





  Levaquin 500 Mg Premixed Ivpb -  IVPB   100 mls/hr





  DAILY [facility]   Administration


 


Prednisone  20 mg 03/06/17 10:00 03/06/17 10:29





  Deltasone -  PO   20 mg





  BID [facility]   Administration


 


Roflumilast  500 mcg 03/01/17 14:15 03/06/17 10:12





  Daliresp -  PO   500 mcg





  DAILY [facility]   Administration


 


Tiotropium Bromide  1 puff 03/01/17 14:15 03/06/17 10:20





  Spiriva -  IH   1 pfu





  DAILY [facility]   Administration











ASSESSMENT/PLAN:


Acute on chonic COPD exacerbation with hypoxemic, hypercarbic respiratory 

failure 


-possibly superimposed PNA 


-CXR unremarkable


-noncompliant with home O2 (supposed to wear continuously) 


-slight clinical improvement 


-finishd azithromycin, rocephin 


-levaquin day 2 


-daliresp, spiriva


-symbicort, albuterol, spiriva 


-supplemental O2 NC, possible BIPAP if desats 


-will require pulmonary rehab     





CAD


-s/p PCI, CABG


-No apparent acute events/exacerbation 


-regular outpatient cardiac f/u     





HTN


-lasix       





HLD


-continue home meds 





Dispo: We will continue to follow the patient. Thank you for this consultative 

opportunity.





Problem List





- Problems


(1) Acute on chronic respiratory failure with hypoxemia


Code(s): J96.21 - ACUTE AND CHRONIC RESPIRATORY FAILURE WITH HYPOXIA





(2) Acute on chronic respiratory failure with hypoxia and hypercapnia


Code(s): J96.21 - ACUTE AND CHRONIC RESPIRATORY FAILURE WITH HYPOXIA


J96.22 - ACUTE AND CHRONIC RESPIRATORY FAILURE WITH HYPERCAPNIA





(3) COPD exacerbation


Code(s): J44.1 - CHRONIC OBSTRUCTIVE PULMONARY DISEASE W (ACUTE) EXACERBATION





(4) Cough


Code(s): R05 - COUGH





(5) Abnormal LFTs


Code(s): R79.89 - OTHER SPECIFIED ABNORMAL FINDINGS OF BLOOD CHEMISTRY





(6) Anxiety


Code(s): F41.9 - ANXIETY DISORDER, UNSPECIFIED





(7) CAD (coronary artery disease)


Code(s): I25.10 - ATHSCL HEART DISEASE OF NATIVE CORONARY ARTERY W/O ANG PCTRS 

  Qualifiers: 


     Coronary Disease-Associated Artery/Lesion type: native artery     Native 

vs. transplanted heart: native heart     Associated angina: without angina     

   Qualified Code(s): I25.10 - Atherosclerotic heart disease of native coronary 

artery without angina pectoris  





(8) COPD (chronic obstructive pulmonary disease)


Code(s): J44.9 - CHRONIC OBSTRUCTIVE PULMONARY DISEASE, UNSPECIFIED   





(9) HTN (hypertension)


Code(s): I10 - ESSENTIAL (PRIMARY) HYPERTENSION   Qualifiers: 


     Hypertension type: essential hypertension        Qualified Code(s): I10 - 

Essential (primary) hypertension  





(10) History of coronary artery bypass graft


Code(s): Z95.1 - PRESENCE OF AORTOCORONARY BYPASS GRAFT





(11) History of percutaneous coronary intervention


Code(s): Z98.89 - OTHER SPECIFIED POSTPROCEDURAL STATES * DO NOT USE *





(12) Hypercholesterolemia


Code(s): E78.0 - PURE HYPERCHOLESTEROLEMIA * DO NOT USE *





(13) Pneumonia


Code(s): J18.9 - PNEUMONIA, UNSPECIFIED ORGANISM   








Visit type





- Emergency Visit


Emergency Visit: Yes


ED Registration Date: 02/28/17


Care time: The patient presented to the Emergency Department on the above date 

and was hospitalized for further evaluation of their emergent condition.





- New Patient


This patient is new to me today: No





- Critical Care


Critical Care patient: No





- Discharge Referral


Referred to [facility] No

## 2017-03-06 NOTE — PN
Progress Note (short form)





- Note


Progress Note: 


PULMONARY





Breathing slightly improved. Still with cough productive of gray sputum and 

wheezing with exertion.





 Last Vital Signs











Temp Pulse Resp BP Pulse Ox


 


 98.1 F   122 H  22   135/70   98 


 


 03/06/17 15:24  03/06/17 15:24  03/06/17 15:24  03/06/17 15:24  03/06/17 10:54








Gen:  NAD in chair


Heart:  tachycardic, regular


Lung: scattered wheezes, rhonchi


Abd: soft, nontender


Ext: + edema





 CBC, BMP





 03/01/17 07:15 





 03/01/17 07:15 





Active Medications





Acetaminophen (Tylenol -)  650 mg PO Q6H PRN


   PRN Reason: FEVER OR PAIN


   Last Admin: 03/01/17 10:40 Dose:  650 mg


Albuterol Sulfate (Ventolin 0.083% Nebulizer Soln -)  1 amp NEB Q4H PRN


   PRN Reason: SHORT OF BREATH/WHEEZING


   Last Admin: 03/06/17 10:54 Dose:  1 amp


Budesonide/Formoterol Fumarate (Symbicort 80/4.5mcg -)  1 puff IH BID [facility]


   Last Admin: 03/06/17 10:10 Dose:  1 inh


Clopidogrel Bisulfate (Plavix -)  75 mg PO DAILY [facility]


   Last Admin: 03/06/17 10:11 Dose:  75 mg


Furosemide (Lasix -)  40 mg PO DAILY [facility]


   Last Admin: 03/06/17 10:11 Dose:  40 mg


Levofloxacin (Levaquin 500 Mg Premixed Ivpb -)  100 mls @ 100 mls/hr IVPB DAILY 

[facility]


   Last Admin: 03/06/17 10:28 Dose:  100 mls/hr


Prednisone (Deltasone -)  20 mg PO BID [facility]


   Last Admin: 03/06/17 10:29 Dose:  20 mg


Roflumilast (Daliresp -)  500 mcg PO DAILY [facility]


   Last Admin: 03/06/17 10:12 Dose:  500 mcg


Tiotropium Bromide (Spiriva -)  1 puff IH DAILY [facility]


   Last Admin: 03/06/17 10:20 Dose:  1 pfu





A/P


Acute on Chronic Hypoxic and Hypercapneic Respiratory Failure


Acute COPD Exacerbation


CAD


HTN


Hyperlipidemia





-  prednisone taper


-  complete antibiotic course


-  inhaled brochodilators


-  O2 to keep SpO2 >90%


-  lasix


-  agree with pulmonary rehab


-  DVT prophylaxis

## 2017-03-06 NOTE — PN
Progress Note, Physician


Chief Complaint: 


SOB persists


History of Present Illness: 


COPD and ASHD





Feels little better needs pulmonary rehab





- Current Medication List


Current Medications: 


Active Medications





Acetaminophen (Tylenol -)  650 mg PO Q6H PRN


   PRN Reason: FEVER OR PAIN


   Last Admin: 03/01/17 10:40 Dose:  650 mg


Albuterol Sulfate (Ventolin 0.083% Nebulizer Soln -)  1 amp NEB Q4H PRN


   PRN Reason: SHORT OF BREATH/WHEEZING


   Last Admin: 03/05/17 13:20 Dose:  1 amp


Alprazolam (Xanax -)  0.5 mg PO TID PRN


   PRN Reason: ANXIETY


   Last Admin: 03/05/17 21:44 Dose:  0.5 mg


Budesonide/Formoterol Fumarate (Symbicort 80/4.5mcg -)  1 puff IH BID [facility]


   Last Admin: 03/05/17 21:44 Dose:  1 inh


Clopidogrel Bisulfate (Plavix -)  75 mg PO DAILY [facility]


   Last Admin: 03/05/17 09:07 Dose:  75 mg


Furosemide (Lasix -)  40 mg PO DAILY [facility]


   Last Admin: 03/05/17 09:06 Dose:  40 mg


Levofloxacin (Levaquin 500 Mg Premixed Ivpb -)  100 mls @ 100 mls/hr IVPB DAILY 

[facility]


   Last Admin: 03/05/17 14:55 Dose:  100 mls/hr


Methylprednisolone Sodium Succinate (Solu-Medrol -)  40 mg IVPB Q8H-IV [facility]


   Last Admin: 03/06/17 01:55 Dose:  40 mg


Roflumilast (Daliresp -)  500 mcg PO DAILY [facility]


   Last Admin: 03/05/17 09:07 Dose:  500 mcg


Tiotropium Bromide (Spiriva -)  1 puff IH DAILY [facility]


   Last Admin: 03/05/17 09:07 Dose:  1 pfu











- Objective


Vital Signs: 


 Vital Signs











Temperature  97.8 F   03/06/17 06:00


 


Pulse Rate  115 H  03/06/17 06:00


 


Respiratory Rate  22   03/06/17 06:00


 


Blood Pressure  135/89   03/06/17 06:00


 


O2 Sat by Pulse Oximetry (%)  99   03/05/17 21:00











Constitutional: Yes: Anxious


Eyes: Yes: WNL


HENT: Yes: WNL


Neck: Yes: WNL


Cardiovascular: Yes: WNL


Respiratory: Yes: On Nasal O2, Poor Air Entry


Gastrointestinal: Yes: WNL


...Rectal Exam: Yes: Deferred


Genitourinary: Yes: WNL


Musculoskeletal: Yes: WNL


Edema: LLE: Trace, RLE: Trace


Integumentary: Yes: WNL


Neurological: Yes: Alert


...Motor Strength: WNL


Labs: 


 CBC, BMP





 03/01/17 07:15 





 03/01/17 07:15 





 INR, PTT











INR  1.06  (0.82-1.09)   02/28/17  16:06    














Assessment/Plan


Short term pulmonary rehab

## 2017-03-06 NOTE — PN
Progress Note, Physician


History of Present Illness: 


This is a 58 yo m with PMH of COPD on 2L at home (noncompliant), past heavy 

smoker, asthma, CAD s/p PCI, CABG 2014, HTN, HLD, who presents due to SOB that 

started 4 days ago and has gotten worse. He reports increased cough productive 

of yellow flem, chills and diarrhea. His PCP prescribed him a Z Pack past fri, 

which helped loosen his respiratory secretions but didnt improve his sob. He 

currently feels unimproved from presentation. He is able to ambulate to 

bathroom on 2 L O2. He denies f/c, n/v, chest pain, abd pain, h/a, rhinorrhea 

or throat pain. Denies sick contacts. He smoked 1.5 packs/day (quit) and worked 

as a . 








   CABG SVG to RCA   Dec. 2014   Dr. Duarte      


 


Medical History      Reviewed


Condition   Date   Treating Physician   Comments


Abnormal stress test showing inferior wall ischemia   October 13, 2014   [facility]      


CAD- Stent o RCA   2008   [facility]      


COPD            


 oRCA prior stent site, o/w nonobstructive ds   Oct. 2014   Dr. France 

[facility]      


Rheumatoid Arthritis            


unsucesfull  attempt   Dec. 3 2014   [facility]      


 





- Current Medication List


Current Medications: 


Active Medications





Acetaminophen (Tylenol -)  650 mg PO Q6H PRN


   PRN Reason: FEVER OR PAIN


   Last Admin: 03/01/17 10:40 Dose:  650 mg


Albuterol Sulfate (Ventolin 0.083% Nebulizer Soln -)  1 amp NEB Q4H PRN


   PRN Reason: SHORT OF BREATH/WHEEZING


   Last Admin: 03/05/17 13:20 Dose:  1 amp


Alprazolam (Xanax -)  0.5 mg PO TID PRN


   PRN Reason: ANXIETY


   Last Admin: 03/06/17 10:28 Dose:  0.5 mg


Budesonide/Formoterol Fumarate (Symbicort 80/4.5mcg -)  1 puff IH BID [facility]


   Last Admin: 03/06/17 10:10 Dose:  1 inh


Clopidogrel Bisulfate (Plavix -)  75 mg PO DAILY [facility]


   Last Admin: 03/06/17 10:11 Dose:  75 mg


Furosemide (Lasix -)  40 mg PO DAILY [facility]


   Last Admin: 03/06/17 10:11 Dose:  40 mg


Levofloxacin (Levaquin 500 Mg Premixed Ivpb -)  100 mls @ 100 mls/hr IVPB DAILY 

[facility]


   Last Admin: 03/06/17 10:28 Dose:  100 mls/hr


Prednisone (Deltasone -)  20 mg PO BID [facility]


   Last Admin: 03/06/17 10:29 Dose:  20 mg


Roflumilast (Daliresp -)  500 mcg PO DAILY [facility]


   Last Admin: 03/06/17 10:12 Dose:  500 mcg


Tiotropium Bromide (Spiriva -)  1 puff IH DAILY [facility]


   Last Admin: 03/06/17 10:20 Dose:  1 pfu











- Objective


Vital Signs: 


 Vital Signs











Temperature  97 F L  03/06/17 10:00


 


Pulse Rate  118 H  03/06/17 10:00


 


Respiratory Rate  22   03/06/17 10:00


 


Blood Pressure  126/79   03/06/17 10:00


 


O2 Sat by Pulse Oximetry (%)  99   03/05/17 21:00











Eyes: Yes: WNL, Conjunctiva Clear, EOM Intact


HENT: Yes: WNL, Atraumatic, Normocephalic


Neck: Yes: WNL, Supple, Trachea Midline


Cardiovascular: Yes: WNL, Regular Rate and Rhythm


Respiratory: Yes: WNL, Regular, CTA Bilaterally


Gastrointestinal: Yes: WNL, Normal Bowel Sounds


Genitourinary: Yes: WNL


Musculoskeletal: Yes: WNL


Extremities: Yes: WNL


Edema: No


Integumentary: Yes: WNL


Neurological: Yes: WNL, Alert, Oriented


...Motor Strength: WNL


Psychiatric: Yes: WNL


Labs: 


 CBC, BMP





 03/01/17 07:15 





 03/01/17 07:15 





 INR, PTT











INR  1.06  (0.82-1.09)   02/28/17  16:06    














Assessment/Plan


ashd s/p pci


s/p cabg


copd


chf


hld





Plan


cont present rx


check bnp


cont lasix


cont plavix

## 2017-03-07 NOTE — PN
Progress Note, Physician


Chief Complaint: 


SOB better


History of Present Illness: 


 working on to put him to pulmonary rehab


If no he can be discharged home with VNS





- Current Medication List


Current Medications: 


Active Medications





Acetaminophen (Tylenol -)  650 mg PO Q6H PRN


   PRN Reason: FEVER OR PAIN


   Last Admin: 03/01/17 10:40 Dose:  650 mg


Albuterol Sulfate (Ventolin 0.083% Nebulizer Soln -)  1 amp NEB Q4H PRN


   PRN Reason: SHORT OF BREATH/WHEEZING


   Last Admin: 03/07/17 06:40 Dose:  1 amp


Alprazolam (Xanax -)  0.5 mg PO TID PRN


   PRN Reason: ANXIETY


   Last Admin: 03/07/17 09:10 Dose:  0.5 mg


Budesonide/Formoterol Fumarate (Symbicort 80/4.5mcg -)  1 puff IH BID [facility]


   Last Admin: 03/07/17 09:13 Dose:  1 inh


Clopidogrel Bisulfate (Plavix -)  75 mg PO DAILY [facility]


   Last Admin: 03/07/17 09:10 Dose:  75 mg


Furosemide (Lasix -)  40 mg PO DAILY [facility]


   Last Admin: 03/07/17 09:10 Dose:  40 mg


Levofloxacin (Levaquin 500 Mg Premixed Ivpb -)  100 mls @ 100 mls/hr IVPB DAILY 

[facility]


   Last Admin: 03/07/17 09:10 Dose:  100 mls/hr


Prednisone (Deltasone -)  20 mg PO BID [facility]


   Last Admin: 03/07/17 09:10 Dose:  20 mg


Roflumilast (Daliresp -)  500 mcg PO DAILY [facility]


   Last Admin: 03/07/17 09:10 Dose:  500 mcg


Tiotropium Bromide (Spiriva -)  1 puff IH DAILY [facility]


   Last Admin: 03/06/17 10:20 Dose:  1 pfu











- Objective


Vital Signs: 


 Vital Signs











Temperature  98.0 F   03/07/17 06:00


 


Pulse Rate  106 H  03/07/17 06:00


 


Respiratory Rate  20   03/07/17 06:00


 


Blood Pressure  143/86   03/07/17 06:00


 


O2 Sat by Pulse Oximetry (%)  97   03/06/17 21:00











Constitutional: Yes: No Distress


Eyes: Yes: WNL


HENT: Yes: WNL


Neck: Yes: WNL


Cardiovascular: Yes: WNL


Respiratory: Yes: WNL, On Nasal O2


...Rectal Exam: Yes: Deferred


Genitourinary: Yes: WNL


Edema: LUE: 1+, RUE: 1+, LLE: 1+, RLE: 1+


Neurological: Yes: Alert


...Motor Strength: WNL


Psychiatric: Yes: WNL


Labs: 


 CBC, BMP





 03/01/17 07:15 





 03/01/17 07:15 





 INR, PTT











INR  1.06  (0.82-1.09)   02/28/17  16:06

## 2017-03-07 NOTE — PN
Progress Note, Physician


Chief Complaint: 


Pt sitting up at bedside; shows faint pink on tissue after blowing nose; denies 

mei bleed. No chest pain; +dyspnea on mild exertion. He is anxious (worried 

over whether it is better to return home, a "small and dirty place" where he 

takes care of himself, or go to a temporary nursing home "to get stronger first

"). 


History of Present Illness: 


The patient is a 59 year old male with a past medical hx of COPD, asthma, CAD s/

p PCI and stents s/p CABG in 2014, HTN, HLD who presents to the ED complaining 

of cough and shortness of breath for 4 days. He reports his cough is 

productive. The patient reports he saw his PCP on Friday and was given a Zpack 

for his symptoms. The patient notes no relief of his symptoms despite taking 

the Zpack. The patient states he has oxygen at home but is not fully dependent 

on it. He states he used his nebulizer treatment one time today. He reports 

associated chills and diarrhea. 


The patient denies fever, chest pain


The patient denies nausea, vomiting, abdominal pain








- Current Medication List


Current Medications: 


Active Medications





Acetaminophen (Tylenol -)  650 mg PO Q6H PRN


   PRN Reason: FEVER OR PAIN


   Last Admin: 03/01/17 10:40 Dose:  650 mg


Albuterol Sulfate (Ventolin 0.083% Nebulizer Soln -)  1 amp NEB Q4H PRN


   PRN Reason: SHORT OF BREATH/WHEEZING


   Last Admin: 03/07/17 06:40 Dose:  1 amp


Alprazolam (Xanax -)  0.5 mg PO TID PRN


   PRN Reason: ANXIETY


   Last Admin: 03/06/17 21:26 Dose:  0.5 mg


Budesonide/Formoterol Fumarate (Symbicort 80/4.5mcg -)  1 puff IH BID [facility]


   Last Admin: 03/06/17 21:26 Dose:  1 inh


Clopidogrel Bisulfate (Plavix -)  75 mg PO DAILY [facility]


   Last Admin: 03/06/17 10:11 Dose:  75 mg


Furosemide (Lasix -)  40 mg PO DAILY [facility]


   Last Admin: 03/06/17 10:11 Dose:  40 mg


Levofloxacin (Levaquin 500 Mg Premixed Ivpb -)  100 mls @ 100 mls/hr IVPB DAILY 

[facility]


   Last Admin: 03/06/17 10:28 Dose:  100 mls/hr


Prednisone (Deltasone -)  20 mg PO BID [facility]


   Last Admin: 03/06/17 21:26 Dose:  20 mg


Roflumilast (Daliresp -)  500 mcg PO DAILY [facility]


   Last Admin: 03/06/17 10:12 Dose:  500 mcg


Tiotropium Bromide (Spiriva -)  1 puff IH DAILY [facility]


   Last Admin: 03/06/17 10:20 Dose:  1 pfu











- Objective


Vital Signs: 


 Vital Signs











Temperature  98.0 F   03/07/17 06:00


 


Pulse Rate  106 H  03/07/17 06:00


 


Respiratory Rate  20   03/07/17 06:00


 


Blood Pressure  143/86   03/07/17 06:00


 


O2 Sat by Pulse Oximetry (%)  97   03/06/17 21:00











Constitutional: Yes: Anxious


Eyes: Yes: WNL


HENT: Yes: WNL


Neck: Yes: WNL


Cardiovascular: Yes: Tachycardia


Respiratory: Yes: Diminished, Wheezes


Gastrointestinal: Yes: Soft


...Rectal Exam: Yes: Deferred


Genitourinary: No: Anuria


Musculoskeletal: Yes: WNL


Extremities: Yes: WNL


Edema: No


Peripheral Pulses WNL: Yes


Integumentary: Yes: WNL


Neurological: Yes: Alert, Oriented


Psychiatric: Yes: Alert, Oriented


Labs: 


 CBC, BMP





 03/01/17 07:15 





 03/01/17 07:15 





 INR, PTT











INR  1.06  (0.82-1.09)   02/28/17  16:06    














- ....Imaging


Chest X-ray: Image Reviewed (COPD (emphysema); no acute infiltrate or congestion

)





Problem List





- Problems


(1) COPD exacerbation


Code(s): J44.1 - CHRONIC OBSTRUCTIVE PULMONARY DISEASE W (ACUTE) EXACERBATION





(2) Cough


Code(s): R05 - COUGH





(3) Abnormal LFTs


Code(s): R79.89 - OTHER SPECIFIED ABNORMAL FINDINGS OF BLOOD CHEMISTRY





(4) Anxiety


Code(s): F41.9 - ANXIETY DISORDER, UNSPECIFIED





(5) CAD (coronary artery disease)


Code(s): I25.10 - ATHSCL HEART DISEASE OF NATIVE CORONARY ARTERY W/O ANG PCTRS 

  Qualifiers: 


     Coronary Disease-Associated Artery/Lesion type: native artery     Native 

vs. transplanted heart: native heart     Associated angina: without angina     

   Qualified Code(s): I25.10 - Atherosclerotic heart disease of native coronary 

artery without angina pectoris  





(6) HTN (hypertension)


Code(s): I10 - ESSENTIAL (PRIMARY) HYPERTENSION   Qualifiers: 


     Hypertension type: essential hypertension        Qualified Code(s): I10 - 

Essential (primary) hypertension  





(7) History of coronary artery bypass graft


Assessment/Plan: 


quit cigarettes after the CABG in 2015.


Pt was on metoprolol "for years"; f/u pulmonary workup. If no bronchial asthma, 

consider restarting beta blockers.


On clopidogrel (though denies allergy to ASA).


Code(s): Z95.1 - PRESENCE OF AORTOCORONARY BYPASS GRAFT





(8) History of percutaneous coronary intervention


Code(s): Z98.89 - OTHER SPECIFIED POSTPROCEDURAL STATES * DO NOT USE *





(9) Hypercholesterolemia


Assessment/Plan: 


f/u lipid panel (HDL>120 a year ago).


Code(s): E78.0 - PURE HYPERCHOLESTEROLEMIA * DO NOT USE *





(10) Diastolic CHF


Code(s): I50.30 - UNSPECIFIED DIASTOLIC (CONGESTIVE) HEART FAILURE   





(11) Nosebleed


Assessment/Plan: 


no gross bleed noted; Hb WNL.


On clopidogrel.


Continue observation.


Code(s): R04.0 - EPISTAXIS

## 2017-03-07 NOTE — PN
Progress Note (short form)





- Note


Progress Note: 


PULMONARY





Breathing slightly improved. cough productive of yellow sputum and wheezing 

with exertion.





 Last Vital Signs











Temp Pulse Resp BP Pulse Ox


 


 97.8 F   123 H  20   137/83   95 


 


 03/07/17 09:20  03/07/17 09:20  03/07/17 09:20  03/07/17 09:20  03/07/17 09:00











Gen:  NAD in chair


Heart:  tachycardic, regular


Lung: distant breath sounds


Abd: soft, nontender


Ext: + edema





 CBC, BMP





 03/01/17 07:15 





 03/01/17 07:15 





Active Medications





Acetaminophen (Tylenol -)  650 mg PO Q6H PRN


   PRN Reason: FEVER OR PAIN


   Last Admin: 03/01/17 10:40 Dose:  650 mg


Albuterol Sulfate (Ventolin 0.083% Nebulizer Soln -)  1 amp NEB Q4H PRN


   PRN Reason: SHORT OF BREATH/WHEEZING


   Last Admin: 03/07/17 12:11 Dose:  1 amp


Alprazolam (Xanax -)  0.5 mg PO TID PRN


   PRN Reason: ANXIETY


   Last Admin: 03/07/17 09:10 Dose:  0.5 mg


Budesonide/Formoterol Fumarate (Symbicort 80/4.5mcg -)  1 puff IH BID [facility]


   Last Admin: 03/07/17 09:13 Dose:  1 inh


Clopidogrel Bisulfate (Plavix -)  75 mg PO DAILY [facility]


   Last Admin: 03/07/17 09:10 Dose:  75 mg


Furosemide (Lasix -)  40 mg PO DAILY [facility]


   Last Admin: 03/07/17 09:10 Dose:  40 mg


Levofloxacin (Levaquin 500 Mg Premixed Ivpb -)  100 mls @ 100 mls/hr IVPB DAILY 

[facility]


   Last Admin: 03/07/17 09:10 Dose:  100 mls/hr


Prednisone (Deltasone -)  20 mg PO BID [facility]


   Last Admin: 03/07/17 09:10 Dose:  20 mg


Roflumilast (Daliresp -)  500 mcg PO DAILY [facility]


   Last Admin: 03/07/17 09:10 Dose:  500 mcg


Tiotropium Bromide (Spiriva -)  1 puff IH DAILY [facility]


   Last Admin: 03/06/17 10:20 Dose:  1 pfu








A/P


Acute on Chronic Hypoxic and Hypercapneic Respiratory Failure


Acute COPD Exacerbation


CAD


HTN


Hyperlipidemia





-  prednisone taper


-  complete antibiotic course


-  inhaled brochodilators


-  O2 to keep SpO2 >90%


-  lasix


-  recommend inpatient pulmonary rehab


-  DVT prophylaxis

## 2017-03-08 NOTE — PN
Progress Note, Physician


History of Present Illness: 


This is a 58 yo m with PMH of COPD on 2L at home (noncompliant), past heavy 

smoker, asthma, CAD s/p PCI, CABG 2014, HTN, HLD, who presents due to SOB that 

started 4 days ago and has gotten worse. He reports increased cough productive 

of yellow flem, chills and diarrhea. His PCP prescribed him a Z Pack past fri, 

which helped loosen his respiratory secretions but didnt improve his sob. He 

currently feels unimproved from presentation. He is able to ambulate to 

bathroom on 2 L O2. He denies f/c, n/v, chest pain, abd pain, h/a, rhinorrhea 

or throat pain. Denies sick contacts. He smoked 1.5 packs/day (quit) and worked 

as a . 








   CABG SVG to RCA   Dec. 2014   Dr. Duarte      


 


Medical History      Reviewed


Condition   Date   Treating Physician   Comments


Abnormal stress test showing inferior wall ischemia   October 13, 2014   [facility]      


CAD- Stent o RCA   2008   [facility]      


COPD            


 oRCA prior stent site, o/w nonobstructive ds   Oct. 2014   Dr. France 

[facility]      


Rheumatoid Arthritis            


unsucesfull  attempt   Dec. 3 2014   [facility]      


 





- Current Medication List


Current Medications: 


Active Medications





Acetaminophen (Tylenol -)  650 mg PO Q6H PRN


   PRN Reason: FEVER OR PAIN


   Last Admin: 03/01/17 10:40 Dose:  650 mg


Albuterol Sulfate (Ventolin 0.083% Nebulizer Soln -)  1 amp NEB Q4H PRN


   PRN Reason: SHORT OF BREATH/WHEEZING


   Last Admin: 03/07/17 22:00 Dose:  1 amp


Alprazolam (Xanax -)  0.5 mg PO TID PRN


   PRN Reason: ANXIETY


   Last Admin: 03/07/17 21:02 Dose:  0.5 mg


Budesonide/Formoterol Fumarate (Symbicort 80/4.5mcg -)  1 puff IH BID [facility]


   Last Admin: 03/08/17 09:41 Dose:  1 inh


Clopidogrel Bisulfate (Plavix -)  75 mg PO DAILY [facility]


   Last Admin: 03/08/17 09:41 Dose:  75 mg


Furosemide (Lasix -)  40 mg PO DAILY [facility]


   Last Admin: 03/08/17 09:41 Dose:  40 mg


Levofloxacin (Levaquin 500 Mg Premixed Ivpb -)  100 mls @ 100 mls/hr IVPB DAILY 

[facility]


   Last Admin: 03/08/17 09:41 Dose:  100 mls/hr


Prednisone (Deltasone -)  20 mg PO BID [facility]


   Last Admin: 03/08/17 09:41 Dose:  20 mg


Roflumilast (Daliresp -)  500 mcg PO DAILY [facility]


   Last Admin: 03/08/17 09:40 Dose:  500 mcg


Tiotropium Bromide (Spiriva -)  1 puff IH DAILY [facility]


   Last Admin: 03/08/17 09:40 Dose:  1 pfu











- Objective


Vital Signs: 


 Vital Signs











Temperature  98.3 F   03/08/17 09:00


 


Pulse Rate  101 H  03/08/17 10:21


 


Respiratory Rate  20   03/08/17 09:00


 


Blood Pressure  130/67   03/08/17 09:00


 


O2 Sat by Pulse Oximetry (%)  97   03/08/17 10:21











Eyes: Yes: WNL, Conjunctiva Clear, EOM Intact


HENT: Yes: WNL, Atraumatic, Normocephalic


Neck: Yes: WNL, Supple, Trachea Midline


Cardiovascular: Yes: WNL, Regular Rate and Rhythm


Respiratory: Yes: WNL, Regular, CTA Bilaterally


Gastrointestinal: Yes: WNL, Normal Bowel Sounds


Genitourinary: Yes: WNL


Musculoskeletal: Yes: WNL


Extremities: Yes: WNL


Edema: No


Integumentary: Yes: WNL


Neurological: Yes: WNL, Alert, Oriented


...Motor Strength: WNL


Psychiatric: Yes: WNL


Labs: 


 CBC, BMP





 03/01/17 07:15 





 03/01/17 07:15 





 INR, PTT











INR  1.06  (0.82-1.09)   02/28/17  16:06    














Assessment/Plan


 Problems


(1) COPD exacerbation


Code(s): J44.1 - CHRONIC OBSTRUCTIVE PULMONARY DISEASE W (ACUTE) EXACERBATION





(2) Cough


Code(s): R05 - COUGH





(3) Abnormal LFTs


Code(s): R79.89 - OTHER SPECIFIED ABNORMAL FINDINGS OF BLOOD CHEMISTRY





(4) Anxiety


Code(s): F41.9 - ANXIETY DISORDER, UNSPECIFIED





(5) CAD (coronary artery disease)


Code(s): I25.10 - ATHSCL HEART DISEASE OF NATIVE CORONARY ARTERY W/O ANG PCTRS 

  Qualifiers: 


     Coronary Disease-Associated Artery/Lesion type: native artery     Native 

vs. transplanted heart: native heart     Associated angina: without angina     

   Qualified Code(s): I25.10 - Atherosclerotic heart disease of native coronary 

artery without angina pectoris  





(6) HTN (hypertension)


Code(s): I10 - ESSENTIAL (PRIMARY) HYPERTENSION   Qualifiers: 


     Hypertension type: essential hypertension        Qualified Code(s): I10 - 

Essential (primary) hypertension  





(7) History of coronary artery bypass graft


Assessment/Plan: 


quit cigarettes after the CABG in 2015.


Pt was on metoprolol "for years"; f/u pulmonary workup. If no bronchial asthma, 

consider restarting beta blockers.


On clopidogrel (though denies allergy to ASA).


Code(s): Z95.1 - PRESENCE OF AORTOCORONARY BYPASS GRAFT





(8) History of percutaneous coronary intervention


Code(s): Z98.89 - OTHER SPECIFIED POSTPROCEDURAL STATES * DO NOT USE *





(9) Hypercholesterolemia


Assessment/Plan: 


f/u lipid panel (HDL>120 a year ago).


Code(s): E78.0 - PURE HYPERCHOLESTEROLEMIA * DO NOT USE *





(10) Diastolic CHF


Code(s): I50.30 - UNSPECIFIED DIASTOLIC (CONGESTIVE) HEART FAILURE   





(11) Nosebleed


Assessment/Plan: 


no gross bleed noted; Hb WNL.


On clopidogrel.


Continue observation.


Code(s): R04.0 - EPISTAXIS

## 2017-03-08 NOTE — PN
Progress Note, Physician


Chief Complaint: 


SOB little better


History of Present Illness: 


DC home cancelled


Dr Villar highly recommends pulmonary rehab





- Current Medication List


Current Medications: 


Active Medications





Acetaminophen (Tylenol -)  650 mg PO Q6H PRN


   PRN Reason: FEVER OR PAIN


   Last Admin: 03/01/17 10:40 Dose:  650 mg


Albuterol Sulfate (Ventolin 0.083% Nebulizer Soln -)  1 amp NEB Q4H PRN


   PRN Reason: SHORT OF BREATH/WHEEZING


   Last Admin: 03/07/17 22:00 Dose:  1 amp


Alprazolam (Xanax -)  0.5 mg PO TID PRN


   PRN Reason: ANXIETY


   Last Admin: 03/07/17 21:02 Dose:  0.5 mg


Budesonide/Formoterol Fumarate (Symbicort 80/4.5mcg -)  1 puff IH BID [facility]


   Last Admin: 03/07/17 21:02 Dose:  1 inh


Clopidogrel Bisulfate (Plavix -)  75 mg PO DAILY [facility]


   Last Admin: 03/07/17 09:10 Dose:  75 mg


Furosemide (Lasix -)  40 mg PO DAILY [facility]


   Last Admin: 03/07/17 09:10 Dose:  40 mg


Levofloxacin (Levaquin 500 Mg Premixed Ivpb -)  100 mls @ 100 mls/hr IVPB DAILY 

[facility]


   Last Admin: 03/07/17 09:10 Dose:  100 mls/hr


Prednisone (Deltasone -)  20 mg PO BID [facility]


   Last Admin: 03/07/17 21:02 Dose:  20 mg


Roflumilast (Daliresp -)  500 mcg PO DAILY [facility]


   Last Admin: 03/07/17 09:10 Dose:  500 mcg


Tiotropium Bromide (Spiriva -)  1 puff IH DAILY [facility]


   Last Admin: 03/07/17 12:00 Dose:  1 pfu











- Objective


Vital Signs: 


 Vital Signs











Temperature  98.1 F   03/08/17 04:00


 


Pulse Rate  107 H  03/08/17 04:00


 


Respiratory Rate  20   03/08/17 04:00


 


Blood Pressure  119/78   03/08/17 04:00


 


O2 Sat by Pulse Oximetry (%)  97   03/07/17 21:00











Constitutional: Yes: Anxious


Eyes: Yes: WNL


HENT: Yes: WNL


Neck: Yes: WNL


Cardiovascular: Yes: WNL


Respiratory: Yes: On Nasal O2


Gastrointestinal: Yes: WNL


...Rectal Exam: Yes: WNL


Edema: LUE: Trace, RUE: Trace, LLE: Trace, RLE: Trace


Neurological: Yes: Alert


Psychiatric: Yes: Alert


Labs: 


 CBC, BMP





 03/01/17 07:15 





 03/01/17 07:15 





 INR, PTT











INR  1.06  (0.82-1.09)   02/28/17  16:06

## 2017-03-08 NOTE — PN
Progress Note (short form)





- Note


Progress Note: 


PULMONARY





VSS/AFEBNRILE


SUBJECTIVE IMPROVEMENT





ANICTERIC


DISTANT WITYH SCATTERED WHEEZE


S1S2


BS+ SOFT


NO EDEMA





LABS/MEDS/NOTES/IMAGING REVIEWED





IMP ACUTE ON CHRONIC HYPOXEMIC/HYPERCAPNEC RESPIRATORY FAILURE RESOLVED


      COPD EXACERBATION


      ASHD /P CABG,STENTS


      HTN


      HLD





PLAN ORAL STEROIDS 


        INHALED BRONCHODILATORS


        O2


        BIPAP IF PT DEVELOPES INCREASED RESPIRATORY DISTRESS,HYPERCAPNEA 


        ANTIBIOTICS


        DALIRESP


        NO OBJECTION TO CONTINUING TREATMENT AT SHORT TERM REHAB





KARON SOLIZ MD

## 2017-03-09 NOTE — DS
Physical Examination


Vital Signs: 


 Vital Signs











Temperature  98.5 F   03/09/17 06:00


 


Pulse Rate  106 H  03/09/17 06:00


 


Respiratory Rate  18   03/09/17 06:00


 


Blood Pressure  128/70   03/09/17 06:00


 


O2 Sat by Pulse Oximetry (%)  98   03/08/17 21:00











Findings/Remarks: 


Admitted with exacerbation of COPD treated with IV steroids and antibiotics


Improved,needs pulmonary rehab .He is also known to have ASHD S/P CABG


Constitutional: Yes: Mild Distress


Eyes: Yes: WNL


HENT: Yes: WNL


Neck: Yes: WNL


Cardiovascular: Yes: WNL


Respiratory: Yes: On Nasal O2


Gastrointestinal: Yes: WNL


...Rectal Exam: Yes: Deferred


Renal/: Yes: WNL


Edema: Yes


Edema: LUE: Trace, RUE: Trace, LLE: Trace, RLE: Trace


Neurological: Yes: Alert


...Motor Strength: WNL


Psychiatric: Yes: Alert


Labs: 


 CBC, BMP





 03/01/17 07:15 





 03/01/17 07:15 











Discharge Summary


Reason For Visit: OBSTRUCTIVE CHRONIC BRONCHITIS WITH EXACERBATION


Current Active Problems





Acute on chronic respiratory failure with hypoxemia (Acute) 


Acute on chronic respiratory failure with hypoxia and hypercapnia (Acute) 


COPD exacerbation (Acute) 


Cough (Acute) 


Diastolic CHF (Acute) 


Nosebleed (Acute) 











- Instructions


Diet, Activity, Other Instructions: 


PLEASE HAVE UNIT CLERK CALL RESPIRATORY [phone] TO REMIND THE PATIENT OF THE 

DATE OF SLEEP CONSULTATION APPOINTMENT ALREADY SCHEDULED.


Referrals: 


Keanu Whitaker MD [Primary Care Provider] - 





- Home Medications


Comprehensive Discharge Medication List: 


Ambulatory Orders





Acetaminophen [Tylenol .Regular Strength -] 650 mg PO Q6H PRN #0 tablet 05/09/ 16 


Albuterol 2.5/Ipratropium 0.5 [Duoneb -] 1 amp NEB QIDR  amp 11/30/16 


Budesonide/Formeterol Fumarate [SYMBICORT 80/4.5mcg -] 1 puff IH BID  inhaler 11 /30/16 


Clopidogrel Bisulfate [Plavix -] 75 mg PO DAILY  tablet 11/30/16 


Furosemide [Lasix -] 40 mg PO DAILY  tablet 11/30/16 


Metoprolol Tartrate [Lopressor -] 25 mg PO DAILY 02/28/17 


Prednisone [Deltasone -] 10 mg PO DAILY 02/28/17